# Patient Record
Sex: FEMALE | Race: WHITE | NOT HISPANIC OR LATINO | Employment: FULL TIME | URBAN - METROPOLITAN AREA
[De-identification: names, ages, dates, MRNs, and addresses within clinical notes are randomized per-mention and may not be internally consistent; named-entity substitution may affect disease eponyms.]

---

## 2017-10-11 LAB
CREAT ?TM UR-SCNC: 47.7 UMOL/L
HBA1C MFR BLD HPLC: 5.7 %

## 2018-03-19 LAB — HBA1C MFR BLD HPLC: 5.6 %

## 2018-04-03 ENCOUNTER — OFFICE VISIT (OUTPATIENT)
Dept: ENDOCRINOLOGY | Facility: HOSPITAL | Age: 58
End: 2018-04-03
Payer: COMMERCIAL

## 2018-04-03 VITALS
WEIGHT: 220.2 LBS | BODY MASS INDEX: 34.56 KG/M2 | DIASTOLIC BLOOD PRESSURE: 76 MMHG | HEART RATE: 64 BPM | HEIGHT: 67 IN | SYSTOLIC BLOOD PRESSURE: 132 MMHG

## 2018-04-03 DIAGNOSIS — E04.1 THYROID NODULE: Primary | ICD-10-CM

## 2018-04-03 DIAGNOSIS — E78.5 HYPERLIPIDEMIA, UNSPECIFIED HYPERLIPIDEMIA TYPE: ICD-10-CM

## 2018-04-03 DIAGNOSIS — E11.9 TYPE 2 DIABETES MELLITUS WITHOUT COMPLICATION, WITHOUT LONG-TERM CURRENT USE OF INSULIN (HCC): ICD-10-CM

## 2018-04-03 PROCEDURE — 99204 OFFICE O/P NEW MOD 45 MIN: CPT | Performed by: INTERNAL MEDICINE

## 2018-04-03 RX ORDER — GABAPENTIN 300 MG/1
100 CAPSULE ORAL 3 TIMES DAILY
COMMUNITY
End: 2020-12-02

## 2018-04-03 RX ORDER — FOLIC ACID 1 MG/1
3 TABLET ORAL DAILY
COMMUNITY

## 2018-04-03 RX ORDER — MELOXICAM 15 MG/1
15 TABLET ORAL DAILY
COMMUNITY

## 2018-04-03 RX ORDER — NEEDLES, SAFETY 22GX1 1/2"
NEEDLE, DISPOSABLE MISCELLANEOUS
Refills: 5 | COMMUNITY
Start: 2018-01-23

## 2018-04-03 RX ORDER — ATORVASTATIN CALCIUM 40 MG/1
40 TABLET, FILM COATED ORAL DAILY
COMMUNITY
Start: 2018-02-22

## 2018-04-03 RX ORDER — POTASSIUM CHLORIDE 750 MG/1
20 CAPSULE, EXTENDED RELEASE ORAL 2 TIMES DAILY
COMMUNITY
Start: 2018-02-26

## 2018-04-03 RX ORDER — MULTIVIT-MIN/IRON/FOLIC ACID/K 18-600-40
CAPSULE ORAL
COMMUNITY

## 2018-04-03 RX ORDER — ACETAMINOPHEN 500 MG
500 TABLET ORAL EVERY 6 HOURS PRN
COMMUNITY

## 2018-04-03 RX ORDER — DULOXETIN HYDROCHLORIDE 60 MG/1
60 CAPSULE, DELAYED RELEASE ORAL DAILY
COMMUNITY
Start: 2018-02-14

## 2018-04-03 RX ORDER — INFLIXIMAB 100 MG/10ML
INJECTION, POWDER, LYOPHILIZED, FOR SOLUTION INTRAVENOUS
COMMUNITY

## 2018-04-03 RX ORDER — METHOTREXATE 25 MG/ML
INJECTION, SOLUTION INTRA-ARTERIAL; INTRAMUSCULAR; INTRAVENOUS WEEKLY
Refills: 1 | COMMUNITY
Start: 2018-01-23

## 2018-04-03 RX ORDER — LISINOPRIL 5 MG/1
5 TABLET ORAL DAILY
COMMUNITY
Start: 2018-02-14

## 2018-04-03 RX ORDER — LEUCOVORIN CALCIUM 5 MG/1
TABLET ORAL
COMMUNITY
Start: 2018-01-23

## 2018-04-03 RX ORDER — ASPIRIN 81 MG/1
81 TABLET ORAL DAILY
COMMUNITY

## 2018-04-03 RX ORDER — FUROSEMIDE 20 MG/1
20 TABLET ORAL DAILY
COMMUNITY
Start: 2018-02-14

## 2018-04-03 NOTE — LETTER
April 3, 2018     Ashley Pineda, [address] [facility] [address]    Patient: Justa Her   YOB: 1960   Date of Visit: 4/3/2018       Dear Dr Emmy Mendieta: Thank you for referring Molly Ralph to me for evaluation  Below are my notes for this consultation  If you have questions, please do not hesitate to call me  I look forward to following your patient along with you  Sincerely,        Guy Uribe DO        CC: No Recipients  Guy Uribe DO  4/3/2018 12:22 PM  Sign at close encounter  4/3/2018    Assessment/Plan      Diagnoses and all orders for this visit:    Thyroid nodule    Type 2 diabetes mellitus without complication, without long-term current use of insulin (HCC)  -     Exenatide ER (BYDUREON BCISE) 2 MG/0 85ML AUIJ; Inject 2 mg under the skin once a week  -     metFORMIN (GLUCOPHAGE) 500 mg tablet; 1 tab daily  -     Ambulatory referral to Diabetic Education; Future    Hyperlipidemia, unspecified hyperlipidemia type    Other orders  -     atorvastatin (LIPITOR) 40 mg tablet; Take 40 mg by mouth daily  -     DULoxetine (CYMBALTA) 60 mg delayed release capsule; Take 60 mg by mouth daily  -     furosemide (LASIX) 20 mg tablet; Take 20 mg by mouth daily  -     lisinopril (ZESTRIL) 10 mg tablet; Take 10 mg by mouth daily  -     Discontinue: metFORMIN (GLUCOPHAGE) 500 mg tablet; Take 500 mg by mouth 2 (two) times a day  -     leucovorin (WELLCOVORIN) 5 mg tablet;   -     metoprolol tartrate (LOPRESSOR) 25 mg tablet; Take 25 mg by mouth 2 (two) times a day  -     methotrexate 50 MG/2ML injection; once a week  -     potassium chloride (MICRO-K) 10 MEQ CR capsule; Take 10 mEq by mouth 2 (two) times a day  -     B-D TB SYRINGE 1CC/26GX3/8" 26G X 3/8" 1 ML MISC; INJECT AS DIRECTED  -     folic acid (FOLVITE) 1 mg tablet; Take 3 mg by mouth daily  -     Discontinue: Exenatide (BYETTA 10 MCG PEN SC);  Inject 10 mcg under the skin 2 (two) times a day  - KRILL OIL PO; Take by mouth  -     meloxicam (MOBIC) 15 mg tablet; Take 15 mg by mouth daily  -     Ginger, Zingiber officinalis, (GINGER PO); Take by mouth  -     Cholecalciferol (VITAMIN D) 2000 units CAPS; Take by mouth  -     aspirin (ECOTRIN LOW STRENGTH) 81 mg EC tablet; Take 81 mg by mouth daily  -     gabapentin (NEURONTIN) 300 mg capsule; Take 100 mg by mouth 3 (three) times a day  -     Flaxseed, Linseed, (FLAX SEED OIL PO); Take by mouth  -     acetaminophen (TYLENOL) 500 mg tablet; Take 500 mg by mouth every 6 (six) hours as needed for mild pain  -     Calcium Polycarbophil (FIBER-CAPS PO); Take by mouth  -     Docusate Calcium (STOOL SOFTENER PO); Take by mouth  -     inFLIXimab (REMICADE) 100 mg; Infuse into a venous catheter        Assessment/Plan:  1  Type 2 diabetes:  Controlled on current regimen  Will both I regimen and and have her take metformin 500 mg just once a day  Also transition to Bydureon 2mg weekly with the bcise pen and have her meet with education to learn this new pen  She notes frustration regarding weight gain and difficulty losing weight  Can consider use of saxenda in the future  Her A1c is great we do have room to back off her diabetes medications in the future  Follow-up in 6 months with an A1c just prior  2   Hyperlipidemia:  LDL is slightly higher  She will be following up with her cardiologist for this  3   Thyroid nodules:  Negative biopsy of the larger nodule in the fall of 2017  Will need another ultrasound thyroid function blood work in October of 2018  CC: Diabetes Consult    History of Present Illness     HPI: Alen Martinez is a 59y o  year old female with type 2 diabetes for 6 years  She is on oral agents at home and takes EXENATIDE AND metformin  She denies any polyuria, polydipsia, nocturia and blurry vision  She denies neuropathy, nephropathy and retinopathy  Hypoglycemic episodes: No     The patient's last eye exam was in November 2017  The patient's last foot exam was in October 2017  Blood Sugar/Glucometer/Pump/CGM review: No logs but when she checks she gets 60-80s  She also has thyroid nodules  She denies family history of thyroid cancer or thyroid disease to her knowledge  Denies personal history of radiation exposure to her head or neck  Denies neck compressive symptoms  Negative biopsy in fall of 2017  Review of Systems   Constitutional: Positive for fatigue  Negative for chills and fever  HENT: Negative for trouble swallowing and voice change  Eyes: Negative for visual disturbance  Respiratory: Negative for shortness of breath  Cardiovascular: Negative for chest pain, palpitations and leg swelling  Gastrointestinal: Positive for nausea  Negative for abdominal pain and vomiting  Endocrine: Negative for polydipsia and polyuria  Musculoskeletal: Positive for arthralgias  Negative for myalgias  Skin: Negative for rash  Neurological: Negative for dizziness, tremors and weakness  Hematological: Negative for adenopathy  Psychiatric/Behavioral: Negative for agitation and confusion  Historical Information   History reviewed  No pertinent past medical history  History reviewed  No pertinent surgical history    Social History   History   Alcohol use Not on file     History   Drug use: Unknown     History   Smoking Status    Never Smoker   Smokeless Tobacco    Never Used     Family History:   Family History   Problem Relation Age of Onset    Stroke Mother     Dementia Mother     Diabetes unspecified Father     Heart disease Father     Arthritis Family     Cancer Family     Diabetes unspecified Family     Heart disease Family        Meds/Allergies   Current Outpatient Prescriptions   Medication Sig Dispense Refill    acetaminophen (TYLENOL) 500 mg tablet Take 500 mg by mouth every 6 (six) hours as needed for mild pain      aspirin (ECOTRIN LOW STRENGTH) 81 mg EC tablet Take 81 mg by mouth daily  atorvastatin (LIPITOR) 40 mg tablet Take 40 mg by mouth daily      B-D TB SYRINGE 1CC/26GX3/8" 26G X 3/8" 1 ML MISC INJECT AS DIRECTED  5    Calcium Polycarbophil (FIBER-CAPS PO) Take by mouth      Cholecalciferol (VITAMIN D) 2000 units CAPS Take by mouth      Docusate Calcium (STOOL SOFTENER PO) Take by mouth      DULoxetine (CYMBALTA) 60 mg delayed release capsule Take 60 mg by mouth daily      Flaxseed, Linseed, (FLAX SEED OIL PO) Take by mouth      folic acid (FOLVITE) 1 mg tablet Take 3 mg by mouth daily      furosemide (LASIX) 20 mg tablet Take 20 mg by mouth daily      gabapentin (NEURONTIN) 300 mg capsule Take 100 mg by mouth 3 (three) times a day      Andra, Zingiber officinalis, (ANDRA PO) Take by mouth      inFLIXimab (REMICADE) 100 mg Infuse into a venous catheter      KRILL OIL PO Take by mouth      leucovorin (WELLCOVORIN) 5 mg tablet       lisinopril (ZESTRIL) 10 mg tablet Take 10 mg by mouth daily      meloxicam (MOBIC) 15 mg tablet Take 15 mg by mouth daily      metFORMIN (GLUCOPHAGE) 500 mg tablet 1 tab daily  30 tablet 11    methotrexate 50 MG/2ML injection once a week  1    metoprolol tartrate (LOPRESSOR) 25 mg tablet Take 25 mg by mouth 2 (two) times a day      potassium chloride (MICRO-K) 10 MEQ CR capsule Take 10 mEq by mouth 2 (two) times a day      Exenatide ER (BYDUREON BCISE) 2 MG/0 85ML AUIJ Inject 2 mg under the skin once a week 12 pen 3     No current facility-administered medications for this visit  Allergies   Allergen Reactions    Digoxin Nausea Only, GI Intolerance and Vomiting       Objective   Vitals: Blood pressure 132/76, pulse 64, height 5' 7 28" (1 709 m), weight 99 9 kg (220 lb 3 2 oz)  Invasive Devices          No matching active lines, drains, or airways          Physical Exam   Constitutional: She is oriented to person, place, and time  She appears well-developed and well-nourished  No distress  HENT:   Head: Normocephalic and atraumatic  Mouth/Throat: No oropharyngeal exudate  Eyes: Conjunctivae and EOM are normal  Pupils are equal, round, and reactive to light  No scleral icterus  Neck: Normal range of motion  Neck supple  No thyromegaly present  Cardiovascular: Normal rate and regular rhythm  No murmur heard  Pulmonary/Chest: Effort normal and breath sounds normal  She has no wheezes  Abdominal: Soft  Bowel sounds are normal  She exhibits no distension  There is no tenderness  Musculoskeletal: Normal range of motion  She exhibits no edema  Neurological: She is alert and oriented to person, place, and time  She exhibits normal muscle tone  Skin: Skin is warm and dry  No rash noted  She is not diaphoretic  Psychiatric: She has a normal mood and affect  Her behavior is normal        The history was obtained from the review of the chart and from the patient  Lab Results:   03/19/2018 at [facility]:  Hemoglobin 14, glucose 108, creatinine 0 65, GFR 97, sodium 138, potassium 4 4, liver function within normal limits, calcium 9 9, total cholesterol 246, triglycerides 162, , A1c 5 6, B12 797     11/09/2017 at [address] [place] [address] FNA of left thyroid nodule:  Negative for malignancy  10/31/2017 thyroid ultrasound at [facility]:  Right lobe lower pole 0 6 x 0 4 x 0 6 isoechoic/solid nodule  Left lobe lower pole anteriorly there is a 1 2 x 0 7 by 1 0 hypoechoic/solid nodule a contain scattered punctate echogenicities      Future Appointments  Date Time Provider Marcello Degroot   10/9/2018 12:45 PM BERTA Nguyen [facility]

## 2018-04-03 NOTE — PROGRESS NOTES
4/3/2018    Assessment/Plan      Diagnoses and all orders for this visit:    Thyroid nodule    Type 2 diabetes mellitus without complication, without long-term current use of insulin (HCC)  -     Exenatide ER (BYDUREON BCISE) 2 MG/0 85ML AUIJ; Inject 2 mg under the skin once a week  -     metFORMIN (GLUCOPHAGE) 500 mg tablet; 1 tab daily  -     Ambulatory referral to Diabetic Education; Future    Hyperlipidemia, unspecified hyperlipidemia type    Other orders  -     atorvastatin (LIPITOR) 40 mg tablet; Take 40 mg by mouth daily  -     DULoxetine (CYMBALTA) 60 mg delayed release capsule; Take 60 mg by mouth daily  -     furosemide (LASIX) 20 mg tablet; Take 20 mg by mouth daily  -     lisinopril (ZESTRIL) 10 mg tablet; Take 10 mg by mouth daily  -     Discontinue: metFORMIN (GLUCOPHAGE) 500 mg tablet; Take 500 mg by mouth 2 (two) times a day  -     leucovorin (WELLCOVORIN) 5 mg tablet;   -     metoprolol tartrate (LOPRESSOR) 25 mg tablet; Take 25 mg by mouth 2 (two) times a day  -     methotrexate 50 MG/2ML injection; once a week  -     potassium chloride (MICRO-K) 10 MEQ CR capsule; Take 10 mEq by mouth 2 (two) times a day  -     B-D TB SYRINGE 1CC/26GX3/8" 26G X 3/8" 1 ML MISC; INJECT AS DIRECTED  -     folic acid (FOLVITE) 1 mg tablet; Take 3 mg by mouth daily  -     Discontinue: Exenatide (BYETTA 10 MCG PEN SC); Inject 10 mcg under the skin 2 (two) times a day  -     KRILL OIL PO; Take by mouth  -     meloxicam (MOBIC) 15 mg tablet; Take 15 mg by mouth daily  -     Ginger, Zingiber officinalis, (GINGER PO); Take by mouth  -     Cholecalciferol (VITAMIN D) 2000 units CAPS; Take by mouth  -     aspirin (ECOTRIN LOW STRENGTH) 81 mg EC tablet; Take 81 mg by mouth daily  -     gabapentin (NEURONTIN) 300 mg capsule; Take 100 mg by mouth 3 (three) times a day  -     Flaxseed, Linseed, (FLAX SEED OIL PO); Take by mouth  -     acetaminophen (TYLENOL) 500 mg tablet;  Take 500 mg by mouth every 6 (six) hours as needed for mild pain  -     Calcium Polycarbophil (FIBER-CAPS PO); Take by mouth  -     Docusate Calcium (STOOL SOFTENER PO); Take by mouth  -     inFLIXimab (REMICADE) 100 mg; Infuse into a venous catheter        Assessment/Plan:  1  Type 2 diabetes:  Controlled on current regimen  Will both I regimen and and have her take metformin 500 mg just once a day  Also transition to Bydureon 2mg weekly with the bcise pen and have her meet with education to learn this new pen  She notes frustration regarding weight gain and difficulty losing weight  Can consider use of saxenda in the future  Her A1c is great we do have room to back off her diabetes medications in the future  Follow-up in 6 months with an A1c just prior  2   Hyperlipidemia:  LDL is slightly higher  She will be following up with her cardiologist for this  3   Thyroid nodules:  Negative biopsy of the larger nodule in the fall of 2017  Will need another ultrasound thyroid function blood work in October of 2018  CC: Diabetes Consult    History of Present Illness     HPI: Rowland Primrose is a 59y o  year old female with type 2 diabetes for 6 years  She is on oral agents at home and takes EXENATIDE AND metformin  She denies any polyuria, polydipsia, nocturia and blurry vision  She denies neuropathy, nephropathy and retinopathy  Hypoglycemic episodes: No     The patient's last eye exam was in November 2017  The patient's last foot exam was in October 2017  Blood Sugar/Glucometer/Pump/CGM review: No logs but when she checks she gets 60-80s  She also has thyroid nodules  She denies family history of thyroid cancer or thyroid disease to her knowledge  Denies personal history of radiation exposure to her head or neck  Denies neck compressive symptoms  Negative biopsy in fall of 2017  Review of Systems   Constitutional: Positive for fatigue  Negative for chills and fever  HENT: Negative for trouble swallowing and voice change      Eyes: Negative for visual disturbance  Respiratory: Negative for shortness of breath  Cardiovascular: Negative for chest pain, palpitations and leg swelling  Gastrointestinal: Positive for nausea  Negative for abdominal pain and vomiting  Endocrine: Negative for polydipsia and polyuria  Musculoskeletal: Positive for arthralgias  Negative for myalgias  Skin: Negative for rash  Neurological: Negative for dizziness, tremors and weakness  Hematological: Negative for adenopathy  Psychiatric/Behavioral: Negative for agitation and confusion  Historical Information   History reviewed  No pertinent past medical history  History reviewed  No pertinent surgical history    Social History   History   Alcohol use Not on file     History   Drug use: Unknown     History   Smoking Status    Never Smoker   Smokeless Tobacco    Never Used     Family History:   Family History   Problem Relation Age of Onset    Stroke Mother     Dementia Mother     Diabetes unspecified Father     Heart disease Father     Arthritis Family     Cancer Family     Diabetes unspecified Family     Heart disease Family        Meds/Allergies   Current Outpatient Prescriptions   Medication Sig Dispense Refill    acetaminophen (TYLENOL) 500 mg tablet Take 500 mg by mouth every 6 (six) hours as needed for mild pain      aspirin (ECOTRIN LOW STRENGTH) 81 mg EC tablet Take 81 mg by mouth daily      atorvastatin (LIPITOR) 40 mg tablet Take 40 mg by mouth daily      B-D TB SYRINGE 1CC/26GX3/8" 26G X 3/8" 1 ML MISC INJECT AS DIRECTED  5    Calcium Polycarbophil (FIBER-CAPS PO) Take by mouth      Cholecalciferol (VITAMIN D) 2000 units CAPS Take by mouth      Docusate Calcium (STOOL SOFTENER PO) Take by mouth      DULoxetine (CYMBALTA) 60 mg delayed release capsule Take 60 mg by mouth daily      Flaxseed, Linseed, (FLAX SEED OIL PO) Take by mouth      folic acid (FOLVITE) 1 mg tablet Take 3 mg by mouth daily      furosemide (LASIX) 20 mg tablet Take 20 mg by mouth daily      gabapentin (NEURONTIN) 300 mg capsule Take 100 mg by mouth 3 (three) times a day      Andra, Zingiber officinalis, (ANDRA PO) Take by mouth      inFLIXimab (REMICADE) 100 mg Infuse into a venous catheter      KRILL OIL PO Take by mouth      leucovorin (WELLCOVORIN) 5 mg tablet       lisinopril (ZESTRIL) 10 mg tablet Take 10 mg by mouth daily      meloxicam (MOBIC) 15 mg tablet Take 15 mg by mouth daily      metFORMIN (GLUCOPHAGE) 500 mg tablet 1 tab daily  30 tablet 11    methotrexate 50 MG/2ML injection once a week  1    metoprolol tartrate (LOPRESSOR) 25 mg tablet Take 25 mg by mouth 2 (two) times a day      potassium chloride (MICRO-K) 10 MEQ CR capsule Take 10 mEq by mouth 2 (two) times a day      Exenatide ER (BYDUREON BCISE) 2 MG/0 85ML AUIJ Inject 2 mg under the skin once a week 12 pen 3     No current facility-administered medications for this visit  Allergies   Allergen Reactions    Digoxin Nausea Only, GI Intolerance and Vomiting       Objective   Vitals: Blood pressure 132/76, pulse 64, height 5' 7 28" (1 709 m), weight 99 9 kg (220 lb 3 2 oz)  Invasive Devices          No matching active lines, drains, or airways          Physical Exam   Constitutional: She is oriented to person, place, and time  She appears well-developed and well-nourished  No distress  HENT:   Head: Normocephalic and atraumatic  Mouth/Throat: No oropharyngeal exudate  Eyes: Conjunctivae and EOM are normal  Pupils are equal, round, and reactive to light  No scleral icterus  Neck: Normal range of motion  Neck supple  No thyromegaly present  Cardiovascular: Normal rate and regular rhythm  No murmur heard  Pulmonary/Chest: Effort normal and breath sounds normal  She has no wheezes  Abdominal: Soft  Bowel sounds are normal  She exhibits no distension  There is no tenderness  Musculoskeletal: Normal range of motion  She exhibits no edema     Neurological: She is alert and oriented to person, place, and time  She exhibits normal muscle tone  Skin: Skin is warm and dry  No rash noted  She is not diaphoretic  Psychiatric: She has a normal mood and affect  Her behavior is normal        The history was obtained from the review of the chart and from the patient  Lab Results:   03/19/2018 at [facility]:  Hemoglobin 14, glucose 108, creatinine 0 65, GFR 97, sodium 138, potassium 4 4, liver function within normal limits, calcium 9 9, total cholesterol 246, triglycerides 162, , A1c 5 6, B12 797     11/09/2017 at [facility] FNA of left thyroid nodule:  Negative for malignancy  10/31/2017 thyroid ultrasound at [facility]:  Right lobe lower pole 0 6 x 0 4 x 0 6 isoechoic/solid nodule  Left lobe lower pole anteriorly there is a 1 2 x 0 7 by 1 0 hypoechoic/solid nodule a contain scattered punctate echogenicities      Future Appointments  Date Time Provider Marcello Degroot   10/9/2018 12:45 PM BERTA Orellana [facility]

## 2018-04-10 ENCOUNTER — OFFICE VISIT (OUTPATIENT)
Dept: DIABETES SERVICES | Facility: HOSPITAL | Age: 58
End: 2018-04-10
Payer: COMMERCIAL

## 2018-04-10 VITALS — BODY MASS INDEX: 32.89 KG/M2 | WEIGHT: 217 LBS | HEIGHT: 68 IN

## 2018-04-10 DIAGNOSIS — E11.9 TYPE 2 DIABETES MELLITUS WITHOUT COMPLICATION, WITHOUT LONG-TERM CURRENT USE OF INSULIN (HCC): Primary | ICD-10-CM

## 2018-04-10 PROCEDURE — G0108 DIAB MANAGE TRN  PER INDIV: HCPCS | Performed by: DIETITIAN, REGISTERED

## 2018-04-10 NOTE — Clinical Note
Bydureon teaching completed  She would like to come see me for MNT, scheduled for next week  Please send over an MNT referral to have on file whenever you get a chance   Thanks! [employer]

## 2018-04-10 NOTE — PROGRESS NOTES
Bydureon Education    Met with Rosanne Carroll for [employer]  Colton Cosme was instructed on site selection and rotation; safe needle disposal; medication storage; side effects and precautions of Bydureon  Discused primary side effects of nausea/vomiting, and risk of pancreatitis and thyroid tumor  Patient knows to call their doctor with abdominal pain or lump/swelling in neck  Coltonarline Jimenesa understands this is a once a week injection and should be taken the same day every week  Suggested marking the calendar for injection day for the first few weeks so the medication is not forgotten  We completed training with demo pens, Colton Cosme demonstrated proper pen usage in office today  Instructed that steady states of the medication can be up to 6-10 weeks, therefore Lillie may not notice much change in blood sugars during the first few weeks, this is normal  Discussed that it can be normal to feel a small ball/lump under the skin where injection occurred for a few weeks, this is normal as well  Colton Cosme verbalized understanding of material covered today, knows to call with any questions or concerns  She is interested in MNT, states has never received diabetes education  Scheduled for MNT next week, will request a new referral from Dr Cherie Iniguez  Diabetes Education Record: Colton Cosme was given the following education material: [employer] from the company, hypoglycemia handout  Patient response to instruction  Comprehension: good  Motivation: good  Expected Compliance: good  Readiness: action    Thank you for referring your patient to [facility], it was a pleasure working with them today  Please feel free to call with any questions or concerns      Carina Gibbs, [address] [place] [id number]

## 2018-04-11 DIAGNOSIS — E11.8 TYPE 2 DIABETES MELLITUS WITH COMPLICATION, UNSPECIFIED WHETHER LONG TERM INSULIN USE: Primary | ICD-10-CM

## 2018-04-17 ENCOUNTER — OFFICE VISIT (OUTPATIENT)
Dept: DIABETES SERVICES | Facility: HOSPITAL | Age: 58
End: 2018-04-17
Payer: COMMERCIAL

## 2018-04-17 VITALS — BODY MASS INDEX: 33.49 KG/M2 | WEIGHT: 221 LBS | HEIGHT: 68 IN

## 2018-04-17 DIAGNOSIS — E11.8 DIABETES MELLITUS WITH COMPLICATION (HCC): Primary | ICD-10-CM

## 2018-04-17 PROCEDURE — 97802 MEDICAL NUTRITION INDIV IN: CPT | Performed by: DIETITIAN, REGISTERED

## 2018-04-17 NOTE — PROGRESS NOTES
Medical Nutrition Therapy     Assessment    Visit Type: Initial visit  Chief complaint:  Type 2 Diabetes     HPI:  Met with Shade Pina for initial MNT  Testing sugars in the morning and sometimes at night, sugars almost always in goal range  No log with her, review of her meter shows she tests every few days  States fastings are highest at 120  Recent A1C 5 5% she states  Looking for help with her eating, she eats low carbs and tries to be healthy but frustrated by her weight  Knows that her medications for other health conditions contributes to the weight gain  Food recall shows primary issues: eats many carb free meals then feels shaky later, then has carb containing snacks like fruit on it's own  Discussed the benefit of consistent carb intake and not having carb free meals  Best to eat carbs and protein together, with snacks being lower carb rather than the other way around  She is a stress eater  Often goes for many hours without eating due to her work being on demand  I asked her to keep an online food record for a few days, I anticipate she might not be breaking 1,000kcal/day, which studies show eating too little can hinder weight loss  Together we discussed what foods contain CHO, reading a food label, serving sizes, and set a carb goal of 15-30g CHO/meal to promote weight loss with 15g or less snacks  Put together sample meals for Lillie's reference and evaluated Lillie's current eating plan  Good understanding, Shade Pina will call with questions or for more education  No f/u scheduled at this time         Ht Readings from Last 1 Encounters:   04/10/18 5' 7 5" (1 715 m)     Wt Readings from Last 1 Encounters:   04/10/18 98 4 kg (217 lb)     Weight Change: No    Medical Diagnosis/ICD 10 Code:  E11 8    Barriers to Learning: no barriers    Do you follow any special diet presently?: Yes  Who shops: patient  Who cooks: patient    Food Log: Completed via the method of food recall    Breakfast: rising time varies, up around 5:45am, drinks 24oz water and takes her meds, gets dressed, does 10min on the bike sometimes  6:30am-ishTypical breakfast 3 times a week: 1/2c egg beaters, some low salt low fat ham in it, sometimes a real egg added to it, a fruit like a clemetine and low fat cottage cheese, sometimes with the nausea will have some light wheat bread or 1 slice of rye, or bagel thin  If oatmeal has some yogurt with it; sometimes when days are crazy will do a low sugar smoothie premade or from nutrisystem and fruit, or protein bar lower carb ones   Morning Snack: not much   Lunch: lunch is a hard time, makes a salad the night before so that she is more likely to have it  If feels she is getting shaky and headachey she will reach for [employer] to statisfy it  Colder months will do a vegetable soup  Afternoon Snack: loves baby carrots before dinner plain   Dinner: tries to plan ahead, things like chicken and veggies and dima slaw, limits starches;  joes frozen meal with whole grains and adds more veggies to it  6pm   Evening Snack: never used to until starting nutrisystem, now she feels like she is supposed to from doing that  Fruit or cheese, buys single bags to help control portions     Beverages: water lots of that, herbal tea with lots of stevia, no coffee, icea a lot, rare milk, mixes sometimes sugar free, very little soda as a treat, powerade zero rare when is sick   Eating out/Take out:  Exercise bike at home     Nutrition Diagnosis:  Food and nutrition related knowledge deficit  related to Lack of prior exposure to accurate nutrition related information as evidenced by Verbalizes inaccurate or incomplete information    Intervention: plate method, increased fiber intake, carbohydrate counting, meal timing and individualized meal plan     Treatment Goals: Patient understands education and recommendations    Education Material Given  Mojgan Pedro Luis was provided the Portion Booklet and Planning Healthy Meals     Monitoring and evaluation:    Term code indicator  FH 1 6 3 Carbohydrate Intake Criteria: 15-30g CHO per meal, 15g CHO snacks    Patients Response to Instruction:  Chandler Salazar  Expected Compliancegood    Thank you for coming to the [facility] for education today  Please feel free to call with any questions or concerns      [facility] [place], [address] [place] [phone]

## 2018-04-17 NOTE — PATIENT INSTRUCTIONS
15-30g carbs per meal with protein, lower carb items at the snacks   Test sugars when feeling low to see if they are actually low   Test sugars twice a day- before a meal and 2hr after the same meal, goal is a change of 50pts or less   Check sugars fasting for 2 weeks: 1 week with nothing to eat after dinner, 1 week with a bedtime snack, see which one gives better morning sugars

## 2018-07-23 LAB — HBA1C MFR BLD HPLC: 5.7 %

## 2018-10-31 ENCOUNTER — OFFICE VISIT (OUTPATIENT)
Dept: ENDOCRINOLOGY | Facility: HOSPITAL | Age: 58
End: 2018-10-31
Payer: COMMERCIAL

## 2018-10-31 VITALS
WEIGHT: 224 LBS | SYSTOLIC BLOOD PRESSURE: 124 MMHG | BODY MASS INDEX: 35.16 KG/M2 | HEART RATE: 64 BPM | DIASTOLIC BLOOD PRESSURE: 80 MMHG | HEIGHT: 67 IN

## 2018-10-31 DIAGNOSIS — E11.9 TYPE 2 DIABETES MELLITUS WITHOUT COMPLICATION, WITHOUT LONG-TERM CURRENT USE OF INSULIN (HCC): Primary | ICD-10-CM

## 2018-10-31 DIAGNOSIS — I10 ESSENTIAL HYPERTENSION: ICD-10-CM

## 2018-10-31 DIAGNOSIS — E04.2 MULTIPLE THYROID NODULES: ICD-10-CM

## 2018-10-31 DIAGNOSIS — E78.5 HYPERLIPIDEMIA, UNSPECIFIED HYPERLIPIDEMIA TYPE: ICD-10-CM

## 2018-10-31 PROCEDURE — 99214 OFFICE O/P EST MOD 30 MIN: CPT | Performed by: INTERNAL MEDICINE

## 2018-10-31 NOTE — LETTER
October 31, 2018     Arie Raymundo, [address]    Patient: Hattie Rascon   YOB: 1960   Date of Visit: 10/31/2018       Dear Dr Helena Lr: Thank you for referring Kirby Anandtoby to me for evaluation  Below are my notes for this consultation  If you have questions, please do not hesitate to call me  I look forward to following your patient along with you  Sincerely,        Skyler Bañuelos DO        CC: No Recipients  Skyler Bañuelos DO  10/31/2018  2:57 PM  Sign at close encounter  10/31/2018    Assessment/Plan      Diagnoses and all orders for this visit:    Type 2 diabetes mellitus without complication, without long-term current use of insulin ([facility] [address] )  -     Comprehensive metabolic panel Lab Collect; Future  -     HEMOGLOBIN A1C W/ EAG ESTIMATION Lab Collect; Future  -     Microalbumin / creatinine urine ratio- Lab Collect; Future  -     Dapagliflozin Propanediol (FARXIGA) 5 MG TABS; Take 1 tablet (5 mg total) by mouth daily  -     Comprehensive metabolic panel Lab Collect  -     HEMOGLOBIN A1C W/ EAG ESTIMATION Lab Collect  -     Microalbumin / creatinine urine ratio- Lab Collect    Multiple thyroid nodules  -     TSH, 3rd generation Lab Collect  -     T4, free Lab Collect  -     US thyroid; Future    Hyperlipidemia, unspecified hyperlipidemia type  -     Lipid Panel with Direct LDL reflex Lab Collect; Future  -     Lipid Panel with Direct LDL reflex Lab Collect    Essential hypertension        Assessment/Plan:  1  Type 2 diabetes: This is well controlled on current regimen  We discussed that given her difficulty with weight loss despite strict dietary and and as much exercise as tolerated given her rheumatologic disease that we can consider adjusting her medications a little bit    In this regard I have suggested we hold off on the metformin for now and try Farxiga 5 mg daily in addition to Bydureon see if this helps in terms of weight reduction slightly  Discussed side effects of Daiana Hopson  Discussed that weight reduction will likely not be a large amount but may be enough to start her in the right direction  She will call us if any side effects or concerns emerge  Otherwise we will plan to see her back in 6 months with labs just prior  2   Thyroid nodules:  She is due for a thyroid ultrasound which I have ordered  In fall of 2017 she had a negative biopsy of the large nodule  She is due for a TSH and free T4 which I have ordered  3   Hyperlipidemia:  Controlled on current regimen  4   Hypertension:  Controlled on current regimen  CC: Diabetes Consult    History of Present Illness     HPI: Tiara Hawkins is a 59y o  year old female with type 2 diabetes for 6 years  She is on oral agents at home and takes metformin 500 mg daily and Bydureon 2 mg weekly  She denies any polyuria, polydipsia, nocturia and blurry vision  She denies neuropathy, nephropathy and retinopathy  Hypoglycemic episodes: No      The patient's last eye exam was in November 2017  Blood Sugar/Glucometer/Pump/CGM review:   No logs to review  For hypertension she takes lisinopril 10 mg daily  She takes atorvastatin 40 mg daily for hyperlipidemia  She has a history of thyroid nodules with a negative biopsy in the fall of 2017  Review of Systems   Constitutional: Negative for fatigue  HENT: Negative for trouble swallowing and voice change  Eyes: Negative for visual disturbance  Respiratory: Negative for shortness of breath  Cardiovascular: Negative for chest pain, palpitations and leg swelling  Gastrointestinal: Negative for abdominal pain, nausea and vomiting  Endocrine: Negative for cold intolerance, heat intolerance, polydipsia and polyuria  Musculoskeletal: Negative for arthralgias and myalgias  Skin: Negative for rash  Neurological: Negative for dizziness, tremors and weakness  Hematological: Negative for adenopathy  Psychiatric/Behavioral: Negative for agitation and confusion  Historical Information   No past medical history on file  No past surgical history on file    Social History   History   Alcohol Use No     History   Drug Use No     History   Smoking Status    Never Smoker   Smokeless Tobacco    Never Used     Family History:   Family History   Problem Relation Age of Onset    Stroke Mother     Dementia Mother     Diabetes unspecified Father     Heart disease Father     Arthritis Family     Cancer Family     Diabetes unspecified Family     Heart disease Family        Meds/Allergies   Current Outpatient Prescriptions   Medication Sig Dispense Refill    acetaminophen (TYLENOL) 500 mg tablet Take 500 mg by mouth every 6 (six) hours as needed for mild pain      aspirin (ECOTRIN LOW STRENGTH) 81 mg EC tablet Take 81 mg by mouth daily      atorvastatin (LIPITOR) 40 mg tablet Take 40 mg by mouth daily      B-D TB SYRINGE 1CC/26GX3/8" 26G X 3/8" 1 ML MISC INJECT AS DIRECTED  5    Calcium Polycarbophil (FIBER-CAPS PO) Take by mouth      Cholecalciferol (VITAMIN D) 2000 units CAPS Take by mouth      Docusate Calcium (STOOL SOFTENER PO) Take by mouth      DULoxetine (CYMBALTA) 60 mg delayed release capsule Take 60 mg by mouth daily      Exenatide ER (BYDUREON BCISE) 2 MG/0 85ML AUIJ Inject 2 mg under the skin once a week 12 pen 3    Flaxseed, Linseed, (FLAX SEED OIL PO) Take by mouth      folic acid (FOLVITE) 1 mg tablet Take 3 mg by mouth daily      furosemide (LASIX) 20 mg tablet Take 20 mg by mouth daily      Ginger, Zingiber officinalis, (GINGER PO) Take by mouth      inFLIXimab (REMICADE) 100 mg Infuse into a venous catheter      KRILL OIL PO Take by mouth      leucovorin (WELLCOVORIN) 5 mg tablet       lisinopril (ZESTRIL) 10 mg tablet Take 10 mg by mouth daily      meloxicam (MOBIC) 15 mg tablet Take 15 mg by mouth daily      methotrexate 50 MG/2ML injection once a week  1    metoprolol tartrate (LOPRESSOR) 25 mg tablet Take 25 mg by mouth 2 (two) times a day      potassium chloride (MICRO-K) 10 MEQ CR capsule Take 10 mEq by mouth 2 (two) times a day      Dapagliflozin Propanediol (FARXIGA) 5 MG TABS Take 1 tablet (5 mg total) by mouth daily 90 tablet 3    gabapentin (NEURONTIN) 300 mg capsule Take 100 mg by mouth 3 (three) times a day       No current facility-administered medications for this visit  Allergies   Allergen Reactions    Digoxin Nausea Only, GI Intolerance and Vomiting     Other reaction(s): stomach upset    Other      Other reaction(s): vomiting    Prochlorperazine      Other reaction(s): stomach upset       Objective   Vitals: Blood pressure 124/80, pulse 64, height 5' 7" (1 702 m), weight 102 kg (224 lb)  Invasive Devices          No matching active lines, drains, or airways          Physical Exam   Constitutional: She is oriented to person, place, and time  She appears well-developed and well-nourished  No distress  HENT:   Head: Normocephalic and atraumatic  Eyes: Pupils are equal, round, and reactive to light  Conjunctivae are normal    Neck: Normal range of motion  Neck supple  No thyromegaly present  Cardiovascular: Normal rate and regular rhythm  Pulmonary/Chest: Effort normal  No respiratory distress  Abdominal: Soft  Bowel sounds are normal  She exhibits no distension  Musculoskeletal: Normal range of motion  She exhibits no edema  Neurological: She is alert and oriented to person, place, and time  She exhibits normal muscle tone  Skin: Skin is warm and dry  No rash noted  She is not diaphoretic  Psychiatric: She has a normal mood and affect  Her behavior is normal    Vitals reviewed  The history was obtained from the review of the chart and from the patient      Lab Results:    Most recent Alc is  Lab Results   Component Value Date    HGBA1C 5 6 03/19/2018             Recent Results (from the past 46602 hour(s))   Hemoglobin A1C Collection Time: 10/11/17 12:00 AM   Result Value Ref Range    Hemoglobin A1C 5 7    Microalbumin / creatinine urine ratio    Collection Time: 10/11/17 12:00 AM   Result Value Ref Range    EXT Creatinine Urine 47 7     Microalbum  ,U,Random 3 0 0 0 - 20 0 mg/L    EXTERNAL Microalb/Creat Ratio 3 0    Hemoglobin A1C    Collection Time: 10/11/17 12:00 AM   Result Value Ref Range    Hemoglobin A1C 5 7    Microalbumin / creatinine urine ratio    Collection Time: 10/11/17 12:00 AM   Result Value Ref Range    EXT Creatinine Urine 47 7    Hemoglobin A1C    Collection Time: 03/19/18 12:00 AM   Result Value Ref Range    Hemoglobin A1C 5 6      Labs from 07/23/2018:  A1c 5 7, triglycerides 151, total cholesterol 175, HDL 78, LDL 67, glucose 103, BUN 18, creatinine 0 66, GFR 96, sodium 136, potassium 4 5, calcium 9 6, albumin 4 6, liver function within normal limits  No future appointments  Portions of the record may have been created with voice recognition software  Occasional wrong word or "sound a like" substitutions may have occurred due to the inherent limitations of voice recognition software  Read the chart carefully and recognize, using context, where substitutions have occurred

## 2018-10-31 NOTE — PROGRESS NOTES
10/31/2018    Assessment/Plan      Diagnoses and all orders for this visit:    Type 2 diabetes mellitus without complication, without long-term current use of insulin ([facility] [address] )  -     Comprehensive metabolic panel Lab Collect; Future  -     HEMOGLOBIN A1C W/ EAG ESTIMATION Lab Collect; Future  -     Microalbumin / creatinine urine ratio- Lab Collect; Future  -     Dapagliflozin Propanediol (FARXIGA) 5 MG TABS; Take 1 tablet (5 mg total) by mouth daily  -     Comprehensive metabolic panel Lab Collect  -     HEMOGLOBIN A1C W/ EAG ESTIMATION Lab Collect  -     Microalbumin / creatinine urine ratio- Lab Collect    Multiple thyroid nodules  -     TSH, 3rd generation Lab Collect  -     T4, free Lab Collect  -     US thyroid; Future    Hyperlipidemia, unspecified hyperlipidemia type  -     Lipid Panel with Direct LDL reflex Lab Collect; Future  -     Lipid Panel with Direct LDL reflex Lab Collect    Essential hypertension        Assessment/Plan:  1  Type 2 diabetes: This is well controlled on current regimen  We discussed that given her difficulty with weight loss despite strict dietary and and as much exercise as tolerated given her rheumatologic disease that we can consider adjusting her medications a little bit  In this regard I have suggested we hold off on the metformin for now and try Farxiga 5 mg daily in addition to Bydureon see if this helps in terms of weight reduction slightly  Discussed side effects of [place]  Discussed that weight reduction will likely not be a large amount but may be enough to start her in the right direction  She will call us if any side effects or concerns emerge  Otherwise we will plan to see her back in 6 months with labs just prior  2   Thyroid nodules:  She is due for a thyroid ultrasound which I have ordered  In fall of 2017 she had a negative biopsy of the large nodule  She is due for a TSH and free T4 which I have ordered      3   Hyperlipidemia:  Controlled on current regimen  4   Hypertension:  Controlled on current regimen  CC: Diabetes Consult    History of Present Illness     HPI: Reina Back is a 59y o  year old female with type 2 diabetes for 6 years  She is on oral agents at home and takes metformin 500 mg daily and Bydureon 2 mg weekly  She denies any polyuria, polydipsia, nocturia and blurry vision  She denies neuropathy, nephropathy and retinopathy  Hypoglycemic episodes: No      The patient's last eye exam was in November 2017  Blood Sugar/Glucometer/Pump/CGM review:   No logs to review  For hypertension she takes lisinopril 10 mg daily  She takes atorvastatin 40 mg daily for hyperlipidemia  She has a history of thyroid nodules with a negative biopsy in the fall of 2017  Review of Systems   Constitutional: Negative for fatigue  HENT: Negative for trouble swallowing and voice change  Eyes: Negative for visual disturbance  Respiratory: Negative for shortness of breath  Cardiovascular: Negative for chest pain, palpitations and leg swelling  Gastrointestinal: Negative for abdominal pain, nausea and vomiting  Endocrine: Negative for cold intolerance, heat intolerance, polydipsia and polyuria  Musculoskeletal: Negative for arthralgias and myalgias  Skin: Negative for rash  Neurological: Negative for dizziness, tremors and weakness  Hematological: Negative for adenopathy  Psychiatric/Behavioral: Negative for agitation and confusion  Historical Information   No past medical history on file  No past surgical history on file    Social History   History   Alcohol Use No     History   Drug Use No     History   Smoking Status    Never Smoker   Smokeless Tobacco    Never Used     Family History:   Family History   Problem Relation Age of Onset   Huber Hopkins Stroke Mother     Dementia Mother     Diabetes unspecified Father     Heart disease Father     Arthritis Family     Cancer Family     Diabetes unspecified Family     Heart disease Family        Meds/Allergies   Current Outpatient Prescriptions   Medication Sig Dispense Refill    acetaminophen (TYLENOL) 500 mg tablet Take 500 mg by mouth every 6 (six) hours as needed for mild pain      aspirin (ECOTRIN LOW STRENGTH) 81 mg EC tablet Take 81 mg by mouth daily      atorvastatin (LIPITOR) 40 mg tablet Take 40 mg by mouth daily      B-D TB SYRINGE 1CC/26GX3/8" 26G X 3/8" 1 ML MISC INJECT AS DIRECTED  5    Calcium Polycarbophil (FIBER-CAPS PO) Take by mouth      Cholecalciferol (VITAMIN D) 2000 units CAPS Take by mouth      Docusate Calcium (STOOL SOFTENER PO) Take by mouth      DULoxetine (CYMBALTA) 60 mg delayed release capsule Take 60 mg by mouth daily      Exenatide ER (BYDUREON BCISE) 2 MG/0 85ML AUIJ Inject 2 mg under the skin once a week 12 pen 3    Flaxseed, Linseed, (FLAX SEED OIL PO) Take by mouth      folic acid (FOLVITE) 1 mg tablet Take 3 mg by mouth daily      furosemide (LASIX) 20 mg tablet Take 20 mg by mouth daily      Ginger, Zingiber officinalis, (GINGER PO) Take by mouth      inFLIXimab (REMICADE) 100 mg Infuse into a venous catheter      KRILL OIL PO Take by mouth      leucovorin (WELLCOVORIN) 5 mg tablet       lisinopril (ZESTRIL) 10 mg tablet Take 10 mg by mouth daily      meloxicam (MOBIC) 15 mg tablet Take 15 mg by mouth daily      methotrexate 50 MG/2ML injection once a week  1    metoprolol tartrate (LOPRESSOR) 25 mg tablet Take 25 mg by mouth 2 (two) times a day      potassium chloride (MICRO-K) 10 MEQ CR capsule Take 10 mEq by mouth 2 (two) times a day      Dapagliflozin Propanediol (FARXIGA) 5 MG TABS Take 1 tablet (5 mg total) by mouth daily 90 tablet 3    gabapentin (NEURONTIN) 300 mg capsule Take 100 mg by mouth 3 (three) times a day       No current facility-administered medications for this visit        Allergies   Allergen Reactions    Digoxin Nausea Only, GI Intolerance and Vomiting     Other reaction(s): stomach upset    Other      Other reaction(s): vomiting    Prochlorperazine      Other reaction(s): stomach upset       Objective   Vitals: Blood pressure 124/80, pulse 64, height 5' 7" (1 702 m), weight 102 kg (224 lb)  Invasive Devices          No matching active lines, drains, or airways          Physical Exam   Constitutional: She is oriented to person, place, and time  She appears well-developed and well-nourished  No distress  HENT:   Head: Normocephalic and atraumatic  Eyes: Pupils are equal, round, and reactive to light  Conjunctivae are normal    Neck: Normal range of motion  Neck supple  No thyromegaly present  Cardiovascular: Normal rate and regular rhythm  Pulmonary/Chest: Effort normal  No respiratory distress  Abdominal: Soft  Bowel sounds are normal  She exhibits no distension  Musculoskeletal: Normal range of motion  She exhibits no edema  Neurological: She is alert and oriented to person, place, and time  She exhibits normal muscle tone  Skin: Skin is warm and dry  No rash noted  She is not diaphoretic  Psychiatric: She has a normal mood and affect  Her behavior is normal    Vitals reviewed  The history was obtained from the review of the chart and from the patient  Lab Results:    Most recent Alc is  Lab Results   Component Value Date    HGBA1C 5 6 03/19/2018             Recent Results (from the past 23546 hour(s))   Hemoglobin A1C    Collection Time: 10/11/17 12:00 AM   Result Value Ref Range    Hemoglobin A1C 5 7    Microalbumin / creatinine urine ratio    Collection Time: 10/11/17 12:00 AM   Result Value Ref Range    EXT Creatinine Urine 47 7     Microalbum  ,U,Random 3 0 0 0 - 20 0 mg/L    EXTERNAL Microalb/Creat Ratio 3 0    Hemoglobin A1C    Collection Time: 10/11/17 12:00 AM   Result Value Ref Range    Hemoglobin A1C 5 7    Microalbumin / creatinine urine ratio    Collection Time: 10/11/17 12:00 AM   Result Value Ref Range    EXT Creatinine Urine 47 7    Hemoglobin A1C Collection Time: 03/19/18 12:00 AM   Result Value Ref Range    Hemoglobin A1C 5 6      Labs from 07/23/2018:  A1c 5 7, triglycerides 151, total cholesterol 175, HDL 78, LDL 67, glucose 103, BUN 18, creatinine 0 66, GFR 96, sodium 136, potassium 4 5, calcium 9 6, albumin 4 6, liver function within normal limits  No future appointments  Portions of the record may have been created with voice recognition software  Occasional wrong word or "sound a like" substitutions may have occurred due to the inherent limitations of voice recognition software  Read the chart carefully and recognize, using context, where substitutions have occurred

## 2018-11-02 ENCOUNTER — TELEPHONE (OUTPATIENT)
Dept: ENDOCRINOLOGY | Facility: HOSPITAL | Age: 58
End: 2018-11-02

## 2018-11-02 NOTE — TELEPHONE ENCOUNTER
Received notification that patients insurance requires prior auth for Mat Ely, left message to see if the patient has tried any alternatives such as jardiance or invokana

## 2018-11-08 LAB
T4 FREE SERPL-MCNC: 1.11 NG/DL (ref 0.82–1.77)
TSH SERPL DL<=0.005 MIU/L-ACNC: 1.56 UIU/ML (ref 0.45–4.5)

## 2018-11-09 LAB
LEFT EYE DIABETIC RETINOPATHY: NORMAL
RIGHT EYE DIABETIC RETINOPATHY: NORMAL
SEVERITY (EYE EXAM): NORMAL

## 2018-11-15 DIAGNOSIS — E04.2 MULTIPLE THYROID NODULES: ICD-10-CM

## 2019-02-06 DIAGNOSIS — E11.9 TYPE 2 DIABETES MELLITUS WITHOUT COMPLICATION, WITHOUT LONG-TERM CURRENT USE OF INSULIN (HCC): ICD-10-CM

## 2019-02-06 NOTE — TELEPHONE ENCOUNTER
From: Diane Tabor  Sent: 2/6/2019 10:11 AM EST  Subject: Medication Renewal Request    Diane Tabor would like a refill of the following medications:     Exenatide ER (Billye ) 2 MG/0 85ML KONG West, DO]   Patient Comment: [facility]) says I need new script  I have 2 weeks left      Preferred pharmacy: [facility]

## 2019-04-14 DIAGNOSIS — E11.9 TYPE 2 DIABETES MELLITUS WITHOUT COMPLICATION, WITHOUT LONG-TERM CURRENT USE OF INSULIN (HCC): ICD-10-CM

## 2019-04-15 RX ORDER — EXENATIDE 2 MG/.85ML
INJECTION, SUSPENSION, EXTENDED RELEASE SUBCUTANEOUS
Qty: 10.2 ML | Refills: 1 | Status: SHIPPED | OUTPATIENT
Start: 2019-04-15 | End: 2019-09-07 | Stop reason: SDUPTHER

## 2019-05-04 LAB
ALBUMIN SERPL-MCNC: 4.3 G/DL (ref 3.6–4.8)
ALBUMIN/CREAT UR: <12.3 MG/G CREAT (ref 0–30)
ALBUMIN/GLOB SERPL: 1.5 {RATIO} (ref 1.2–2.2)
ALP SERPL-CCNC: 67 IU/L (ref 39–117)
ALT SERPL-CCNC: 22 IU/L (ref 0–32)
AST SERPL-CCNC: 32 IU/L (ref 0–40)
BILIRUB SERPL-MCNC: 0.7 MG/DL (ref 0–1.2)
BUN SERPL-MCNC: 19 MG/DL (ref 8–27)
BUN/CREAT SERPL: 25 (ref 12–28)
CALCIUM SERPL-MCNC: 9.1 MG/DL (ref 8.7–10.3)
CHLORIDE SERPL-SCNC: 102 MMOL/L (ref 96–106)
CHOLEST SERPL-MCNC: 166 MG/DL (ref 100–199)
CO2 SERPL-SCNC: 24 MMOL/L (ref 20–29)
CREAT SERPL-MCNC: 0.75 MG/DL (ref 0.57–1)
CREAT UR-MCNC: 24.4 MG/DL
EST. AVERAGE GLUCOSE BLD GHB EST-MCNC: 126 MG/DL
GLOBULIN SER-MCNC: 2.9 G/DL (ref 1.5–4.5)
GLUCOSE SERPL-MCNC: 107 MG/DL (ref 65–99)
HBA1C MFR BLD: 6 % (ref 4.8–5.6)
HDLC SERPL-MCNC: 75 MG/DL
LDLC SERPL CALC-MCNC: 70 MG/DL (ref 0–99)
LDLC/HDLC SERPL: 0.9 RATIO (ref 0–3.2)
MICROALBUMIN UR-MCNC: <3 UG/ML
POTASSIUM SERPL-SCNC: 4.2 MMOL/L (ref 3.5–5.2)
PROT SERPL-MCNC: 7.2 G/DL (ref 6–8.5)
SL AMB EGFR AFRICAN AMERICAN: 99 ML/MIN/1.73
SL AMB EGFR NON AFRICAN AMERICAN: 86 ML/MIN/1.73
SL AMB VLDL CHOLESTEROL CALC: 21 MG/DL (ref 5–40)
SODIUM SERPL-SCNC: 138 MMOL/L (ref 134–144)
TRIGL SERPL-MCNC: 106 MG/DL (ref 0–149)

## 2019-05-07 ENCOUNTER — OFFICE VISIT (OUTPATIENT)
Dept: ENDOCRINOLOGY | Facility: HOSPITAL | Age: 59
End: 2019-05-07
Payer: COMMERCIAL

## 2019-05-07 VITALS
BODY MASS INDEX: 35.19 KG/M2 | WEIGHT: 224.2 LBS | DIASTOLIC BLOOD PRESSURE: 78 MMHG | SYSTOLIC BLOOD PRESSURE: 120 MMHG | HEIGHT: 67 IN | HEART RATE: 68 BPM

## 2019-05-07 DIAGNOSIS — E78.5 HYPERLIPIDEMIA, UNSPECIFIED HYPERLIPIDEMIA TYPE: ICD-10-CM

## 2019-05-07 DIAGNOSIS — E04.2 MULTIPLE THYROID NODULES: ICD-10-CM

## 2019-05-07 DIAGNOSIS — I10 ESSENTIAL HYPERTENSION: ICD-10-CM

## 2019-05-07 DIAGNOSIS — E11.9 TYPE 2 DIABETES MELLITUS WITHOUT COMPLICATION, WITHOUT LONG-TERM CURRENT USE OF INSULIN (HCC): Primary | ICD-10-CM

## 2019-05-07 PROCEDURE — 99214 OFFICE O/P EST MOD 30 MIN: CPT | Performed by: INTERNAL MEDICINE

## 2019-08-02 ENCOUNTER — TELEPHONE (OUTPATIENT)
Dept: ENDOCRINOLOGY | Facility: CLINIC | Age: 59
End: 2019-08-02

## 2019-09-07 DIAGNOSIS — E11.9 TYPE 2 DIABETES MELLITUS WITHOUT COMPLICATION, WITHOUT LONG-TERM CURRENT USE OF INSULIN (HCC): ICD-10-CM

## 2019-09-08 RX ORDER — EXENATIDE 2 MG/.85ML
INJECTION, SUSPENSION, EXTENDED RELEASE SUBCUTANEOUS
Qty: 10.2 ML | Refills: 0 | Status: SHIPPED | OUTPATIENT
Start: 2019-09-08 | End: 2019-10-27 | Stop reason: SDUPTHER

## 2019-10-24 LAB
ALBUMIN SERPL-MCNC: 4.4 G/DL (ref 3.6–4.8)
ALBUMIN/CREAT UR: <12.1 MG/G CREAT (ref 0–30)
ALBUMIN/GLOB SERPL: 1.4 {RATIO} (ref 1.2–2.2)
ALP SERPL-CCNC: 74 IU/L (ref 39–117)
ALT SERPL-CCNC: 23 IU/L (ref 0–32)
AST SERPL-CCNC: 22 IU/L (ref 0–40)
BASOPHILS # BLD AUTO: 0 X10E3/UL (ref 0–0.2)
BASOPHILS NFR BLD AUTO: 0 %
BILIRUB SERPL-MCNC: 0.9 MG/DL (ref 0–1.2)
BUN SERPL-MCNC: 15 MG/DL (ref 8–27)
BUN/CREAT SERPL: 23 (ref 12–28)
CALCIUM SERPL-MCNC: 9.9 MG/DL (ref 8.7–10.3)
CHLORIDE SERPL-SCNC: 96 MMOL/L (ref 96–106)
CHOLEST SERPL-MCNC: 183 MG/DL (ref 100–199)
CO2 SERPL-SCNC: 25 MMOL/L (ref 20–29)
CREAT SERPL-MCNC: 0.66 MG/DL (ref 0.57–1)
CREAT UR-MCNC: 24.8 MG/DL
EOSINOPHIL # BLD AUTO: 0.2 X10E3/UL (ref 0–0.4)
EOSINOPHIL NFR BLD AUTO: 3 %
ERYTHROCYTE [DISTWIDTH] IN BLOOD BY AUTOMATED COUNT: 14.3 % (ref 12.3–15.4)
EST. AVERAGE GLUCOSE BLD GHB EST-MCNC: 128 MG/DL
GLOBULIN SER-MCNC: 3.1 G/DL (ref 1.5–4.5)
GLUCOSE SERPL-MCNC: 127 MG/DL (ref 65–99)
HBA1C MFR BLD: 6.1 % (ref 4.8–5.6)
HCT VFR BLD AUTO: 43.4 % (ref 34–46.6)
HDLC SERPL-MCNC: 63 MG/DL
HGB BLD-MCNC: 14.7 G/DL (ref 11.1–15.9)
IMM GRANULOCYTES # BLD: 0 X10E3/UL (ref 0–0.1)
IMM GRANULOCYTES NFR BLD: 0 %
LDLC SERPL CALC-MCNC: 67 MG/DL (ref 0–99)
LDLC/HDLC SERPL: 1.1 RATIO (ref 0–3.2)
LYMPHOCYTES # BLD AUTO: 2 X10E3/UL (ref 0.7–3.1)
LYMPHOCYTES NFR BLD AUTO: 30 %
MCH RBC QN AUTO: 32.4 PG (ref 26.6–33)
MCHC RBC AUTO-ENTMCNC: 33.9 G/DL (ref 31.5–35.7)
MCV RBC AUTO: 96 FL (ref 79–97)
MICROALBUMIN UR-MCNC: <3 UG/ML
MONOCYTES # BLD AUTO: 0.5 X10E3/UL (ref 0.1–0.9)
MONOCYTES NFR BLD AUTO: 7 %
NEUTROPHILS # BLD AUTO: 4.1 X10E3/UL (ref 1.4–7)
NEUTROPHILS NFR BLD AUTO: 60 %
PLATELET # BLD AUTO: 259 X10E3/UL (ref 150–450)
POTASSIUM SERPL-SCNC: 4.4 MMOL/L (ref 3.5–5.2)
PROT SERPL-MCNC: 7.5 G/DL (ref 6–8.5)
RBC # BLD AUTO: 4.54 X10E6/UL (ref 3.77–5.28)
SL AMB EGFR AFRICAN AMERICAN: 110 ML/MIN/1.73
SL AMB EGFR NON AFRICAN AMERICAN: 96 ML/MIN/1.73
SL AMB VLDL CHOLESTEROL CALC: 53 MG/DL (ref 5–40)
SODIUM SERPL-SCNC: 140 MMOL/L (ref 134–144)
TRIGL SERPL-MCNC: 263 MG/DL (ref 0–149)
TSH SERPL DL<=0.005 MIU/L-ACNC: 1.75 UIU/ML (ref 0.45–4.5)
WBC # BLD AUTO: 6.8 X10E3/UL (ref 3.4–10.8)

## 2019-10-27 DIAGNOSIS — E11.9 TYPE 2 DIABETES MELLITUS WITHOUT COMPLICATION, WITHOUT LONG-TERM CURRENT USE OF INSULIN (HCC): ICD-10-CM

## 2019-10-28 RX ORDER — EXENATIDE 2 MG/.85ML
INJECTION, SUSPENSION, EXTENDED RELEASE SUBCUTANEOUS
Qty: 10.2 ML | Refills: 0 | Status: SHIPPED | OUTPATIENT
Start: 2019-10-28 | End: 2019-11-08 | Stop reason: SDUPTHER

## 2019-11-08 ENCOUNTER — OFFICE VISIT (OUTPATIENT)
Dept: ENDOCRINOLOGY | Facility: HOSPITAL | Age: 59
End: 2019-11-08
Payer: COMMERCIAL

## 2019-11-08 VITALS
DIASTOLIC BLOOD PRESSURE: 68 MMHG | HEART RATE: 68 BPM | BODY MASS INDEX: 36.63 KG/M2 | WEIGHT: 233.4 LBS | SYSTOLIC BLOOD PRESSURE: 104 MMHG | HEIGHT: 67 IN

## 2019-11-08 DIAGNOSIS — E11.9 TYPE 2 DIABETES MELLITUS WITHOUT COMPLICATION, WITHOUT LONG-TERM CURRENT USE OF INSULIN (HCC): Primary | ICD-10-CM

## 2019-11-08 DIAGNOSIS — E78.5 HYPERLIPIDEMIA, UNSPECIFIED HYPERLIPIDEMIA TYPE: ICD-10-CM

## 2019-11-08 DIAGNOSIS — E04.2 MULTIPLE THYROID NODULES: ICD-10-CM

## 2019-11-08 DIAGNOSIS — I10 ESSENTIAL HYPERTENSION: ICD-10-CM

## 2019-11-08 PROCEDURE — 99214 OFFICE O/P EST MOD 30 MIN: CPT | Performed by: INTERNAL MEDICINE

## 2019-11-08 RX ORDER — LANCETS 30 GAUGE
EACH MISCELLANEOUS
Qty: 200 EACH | Refills: 3 | Status: SHIPPED | OUTPATIENT
Start: 2019-11-08 | End: 2019-11-12

## 2019-11-11 ENCOUNTER — VBI (OUTPATIENT)
Dept: ADMINISTRATIVE | Facility: OTHER | Age: 59
End: 2019-11-11

## 2019-11-12 DIAGNOSIS — E11.9 TYPE 2 DIABETES MELLITUS WITHOUT COMPLICATION, WITHOUT LONG-TERM CURRENT USE OF INSULIN (HCC): Primary | ICD-10-CM

## 2019-11-12 RX ORDER — BLOOD SUGAR DIAGNOSTIC
STRIP MISCELLANEOUS
Qty: 100 EACH | Refills: 3 | Status: SHIPPED | OUTPATIENT
Start: 2019-11-12 | End: 2020-09-25

## 2019-11-12 NOTE — TELEPHONE ENCOUNTER
Spoke with [facility], the patient uses [facility] test strips and also needs a script for [employer]

## 2019-11-22 LAB
LEFT EYE DIABETIC RETINOPATHY: NORMAL
RIGHT EYE DIABETIC RETINOPATHY: NORMAL

## 2020-02-11 ENCOUNTER — DOCUMENTATION (OUTPATIENT)
Dept: ENDOCRINOLOGY | Facility: HOSPITAL | Age: 60
End: 2020-02-11

## 2020-05-15 LAB
ALBUMIN SERPL-MCNC: 4.4 G/DL (ref 3.8–4.8)
ALBUMIN/CREAT UR: 3 MG/G CREAT (ref 0–29)
ALBUMIN/GLOB SERPL: 1.6 {RATIO} (ref 1.2–2.2)
ALP SERPL-CCNC: 67 IU/L (ref 39–117)
ALT SERPL-CCNC: 23 IU/L (ref 0–32)
AST SERPL-CCNC: 22 IU/L (ref 0–40)
BASOPHILS # BLD AUTO: 0 X10E3/UL (ref 0–0.2)
BASOPHILS NFR BLD AUTO: 0 %
BILIRUB SERPL-MCNC: 0.7 MG/DL (ref 0–1.2)
BUN SERPL-MCNC: 17 MG/DL (ref 8–27)
BUN/CREAT SERPL: 23 (ref 12–28)
CALCIUM SERPL-MCNC: 9.2 MG/DL (ref 8.7–10.3)
CHLORIDE SERPL-SCNC: 97 MMOL/L (ref 96–106)
CHOLEST SERPL-MCNC: 157 MG/DL (ref 100–199)
CO2 SERPL-SCNC: 24 MMOL/L (ref 20–29)
CREAT SERPL-MCNC: 0.74 MG/DL (ref 0.57–1)
CREAT UR-MCNC: 92.8 MG/DL
EOSINOPHIL # BLD AUTO: 0.4 X10E3/UL (ref 0–0.4)
EOSINOPHIL NFR BLD AUTO: 5 %
ERYTHROCYTE [DISTWIDTH] IN BLOOD BY AUTOMATED COUNT: 13.1 % (ref 11.7–15.4)
EST. AVERAGE GLUCOSE BLD GHB EST-MCNC: 137 MG/DL
GLOBULIN SER-MCNC: 2.7 G/DL (ref 1.5–4.5)
GLUCOSE SERPL-MCNC: 107 MG/DL (ref 65–99)
HBA1C MFR BLD: 6.4 % (ref 4.8–5.6)
HCT VFR BLD AUTO: 39.4 % (ref 34–46.6)
HDLC SERPL-MCNC: 65 MG/DL
HGB BLD-MCNC: 13.4 G/DL (ref 11.1–15.9)
IMM GRANULOCYTES # BLD: 0 X10E3/UL (ref 0–0.1)
IMM GRANULOCYTES NFR BLD: 0 %
LDLC SERPL CALC-MCNC: 51 MG/DL (ref 0–99)
LDLC/HDLC SERPL: 0.8 RATIO (ref 0–3.2)
LYMPHOCYTES # BLD AUTO: 2.5 X10E3/UL (ref 0.7–3.1)
LYMPHOCYTES NFR BLD AUTO: 37 %
MCH RBC QN AUTO: 31.3 PG (ref 26.6–33)
MCHC RBC AUTO-ENTMCNC: 34 G/DL (ref 31.5–35.7)
MCV RBC AUTO: 92 FL (ref 79–97)
MICROALBUMIN UR-MCNC: 3.2 UG/ML
MONOCYTES # BLD AUTO: 0.5 X10E3/UL (ref 0.1–0.9)
MONOCYTES NFR BLD AUTO: 8 %
NEUTROPHILS # BLD AUTO: 3.3 X10E3/UL (ref 1.4–7)
NEUTROPHILS NFR BLD AUTO: 50 %
PLATELET # BLD AUTO: 244 X10E3/UL (ref 150–450)
POTASSIUM SERPL-SCNC: 4.6 MMOL/L (ref 3.5–5.2)
PROT SERPL-MCNC: 7.1 G/DL (ref 6–8.5)
RBC # BLD AUTO: 4.28 X10E6/UL (ref 3.77–5.28)
SL AMB EGFR AFRICAN AMERICAN: 100 ML/MIN/1.73
SL AMB EGFR NON AFRICAN AMERICAN: 87 ML/MIN/1.73
SL AMB VLDL CHOLESTEROL CALC: 41 MG/DL (ref 5–40)
SODIUM SERPL-SCNC: 137 MMOL/L (ref 134–144)
TRIGL SERPL-MCNC: 203 MG/DL (ref 0–149)
TSH SERPL DL<=0.005 MIU/L-ACNC: 1.43 UIU/ML (ref 0.45–4.5)
WBC # BLD AUTO: 6.6 X10E3/UL (ref 3.4–10.8)

## 2020-05-22 ENCOUNTER — TELEMEDICINE (OUTPATIENT)
Dept: ENDOCRINOLOGY | Facility: HOSPITAL | Age: 60
End: 2020-05-22
Payer: COMMERCIAL

## 2020-05-22 DIAGNOSIS — E78.5 HYPERLIPIDEMIA, UNSPECIFIED HYPERLIPIDEMIA TYPE: ICD-10-CM

## 2020-05-22 DIAGNOSIS — E11.9 TYPE 2 DIABETES MELLITUS WITHOUT COMPLICATION, WITHOUT LONG-TERM CURRENT USE OF INSULIN (HCC): Primary | ICD-10-CM

## 2020-05-22 DIAGNOSIS — E04.2 MULTIPLE THYROID NODULES: ICD-10-CM

## 2020-05-22 DIAGNOSIS — I10 ESSENTIAL HYPERTENSION: ICD-10-CM

## 2020-05-22 PROCEDURE — 99213 OFFICE O/P EST LOW 20 MIN: CPT | Performed by: INTERNAL MEDICINE

## 2020-09-13 DIAGNOSIS — E11.9 TYPE 2 DIABETES MELLITUS WITHOUT COMPLICATION, WITHOUT LONG-TERM CURRENT USE OF INSULIN (HCC): ICD-10-CM

## 2020-09-18 DIAGNOSIS — E11.9 TYPE 2 DIABETES MELLITUS WITHOUT COMPLICATION, WITHOUT LONG-TERM CURRENT USE OF INSULIN (HCC): Primary | ICD-10-CM

## 2020-09-24 DIAGNOSIS — E11.9 TYPE 2 DIABETES MELLITUS WITHOUT COMPLICATION, WITHOUT LONG-TERM CURRENT USE OF INSULIN (HCC): ICD-10-CM

## 2020-09-25 RX ORDER — BLOOD SUGAR DIAGNOSTIC
STRIP MISCELLANEOUS
Qty: 100 EACH | Refills: 3 | Status: SHIPPED | OUTPATIENT
Start: 2020-09-25 | End: 2021-08-15

## 2020-10-09 DIAGNOSIS — E11.9 TYPE 2 DIABETES MELLITUS WITHOUT COMPLICATION, WITHOUT LONG-TERM CURRENT USE OF INSULIN (HCC): ICD-10-CM

## 2020-10-09 RX ORDER — EXENATIDE 2 MG/.85ML
INJECTION, SUSPENSION, EXTENDED RELEASE SUBCUTANEOUS
Qty: 12 PEN | Refills: 3 | Status: SHIPPED | OUTPATIENT
Start: 2020-10-09 | End: 2020-12-02

## 2020-11-17 LAB
ALBUMIN/CREAT UR: <4 MG/G CREAT (ref 0–29)
CREAT UR-MCNC: 71 MG/DL
EST. AVERAGE GLUCOSE BLD GHB EST-MCNC: 151 MG/DL
HBA1C MFR BLD: 6.9 % (ref 4.8–5.6)
MICROALBUMIN UR-MCNC: <3 UG/ML
TSH SERPL DL<=0.005 MIU/L-ACNC: 1.05 UIU/ML (ref 0.45–4.5)

## 2020-12-02 ENCOUNTER — OFFICE VISIT (OUTPATIENT)
Dept: ENDOCRINOLOGY | Facility: HOSPITAL | Age: 60
End: 2020-12-02
Payer: COMMERCIAL

## 2020-12-02 VITALS
BODY MASS INDEX: 37.45 KG/M2 | WEIGHT: 238.6 LBS | DIASTOLIC BLOOD PRESSURE: 78 MMHG | HEIGHT: 67 IN | SYSTOLIC BLOOD PRESSURE: 128 MMHG | HEART RATE: 64 BPM

## 2020-12-02 DIAGNOSIS — E11.9 TYPE 2 DIABETES MELLITUS WITHOUT COMPLICATION, WITHOUT LONG-TERM CURRENT USE OF INSULIN (HCC): Primary | ICD-10-CM

## 2020-12-02 DIAGNOSIS — E04.2 MULTIPLE THYROID NODULES: ICD-10-CM

## 2020-12-02 DIAGNOSIS — I10 ESSENTIAL HYPERTENSION: ICD-10-CM

## 2020-12-02 DIAGNOSIS — E78.5 HYPERLIPIDEMIA, UNSPECIFIED HYPERLIPIDEMIA TYPE: ICD-10-CM

## 2020-12-02 PROCEDURE — 99214 OFFICE O/P EST MOD 30 MIN: CPT | Performed by: INTERNAL MEDICINE

## 2020-12-02 RX ORDER — DULAGLUTIDE 1.5 MG/.5ML
INJECTION, SOLUTION SUBCUTANEOUS
Qty: 12 PEN | Refills: 0 | Status: SHIPPED | OUTPATIENT
Start: 2020-12-02 | End: 2021-02-03

## 2020-12-02 RX ORDER — DEXAMETHASONE 1 MG
TABLET ORAL
Qty: 1 TABLET | Refills: 0 | Status: SHIPPED | OUTPATIENT
Start: 2020-12-02 | End: 2021-04-14

## 2020-12-04 LAB
LEFT EYE DIABETIC RETINOPATHY: NORMAL
RIGHT EYE DIABETIC RETINOPATHY: NORMAL

## 2020-12-23 LAB
CORTIS AM PEAK SERPL-MCNC: 0.9 UG/DL (ref 6.2–19.4)
DEXAMETHASONE SERPL-MCNC: 174 NG/DL

## 2021-02-03 DIAGNOSIS — E11.9 TYPE 2 DIABETES MELLITUS WITHOUT COMPLICATION, WITHOUT LONG-TERM CURRENT USE OF INSULIN (HCC): ICD-10-CM

## 2021-02-03 RX ORDER — DULAGLUTIDE 1.5 MG/.5ML
INJECTION, SOLUTION SUBCUTANEOUS
Qty: 6 ML | Refills: 3 | Status: SHIPPED | OUTPATIENT
Start: 2021-02-03 | End: 2022-01-03

## 2021-03-10 DIAGNOSIS — Z23 ENCOUNTER FOR IMMUNIZATION: ICD-10-CM

## 2021-04-09 LAB
ALBUMIN SERPL-MCNC: 4.5 G/DL (ref 3.8–4.8)
ALBUMIN/CREAT UR: <26 MG/G CREAT (ref 0–29)
ALBUMIN/GLOB SERPL: 1.4 {RATIO} (ref 1.2–2.2)
ALP SERPL-CCNC: 74 IU/L (ref 39–117)
ALT SERPL-CCNC: 32 IU/L (ref 0–32)
AST SERPL-CCNC: 27 IU/L (ref 0–40)
BASOPHILS # BLD AUTO: 0 X10E3/UL (ref 0–0.2)
BASOPHILS NFR BLD AUTO: 0 %
BILIRUB SERPL-MCNC: 0.8 MG/DL (ref 0–1.2)
BUN SERPL-MCNC: 16 MG/DL (ref 8–27)
BUN/CREAT SERPL: 24 (ref 12–28)
CALCIUM SERPL-MCNC: 10.1 MG/DL (ref 8.7–10.3)
CHLORIDE SERPL-SCNC: 98 MMOL/L (ref 96–106)
CHOLEST SERPL-MCNC: 192 MG/DL (ref 100–199)
CO2 SERPL-SCNC: 27 MMOL/L (ref 20–29)
CREAT SERPL-MCNC: 0.67 MG/DL (ref 0.57–1)
CREAT UR-MCNC: 11.7 MG/DL
EOSINOPHIL # BLD AUTO: 0.4 X10E3/UL (ref 0–0.4)
EOSINOPHIL NFR BLD AUTO: 5 %
ERYTHROCYTE [DISTWIDTH] IN BLOOD BY AUTOMATED COUNT: 12.5 % (ref 11.7–15.4)
EST. AVERAGE GLUCOSE BLD GHB EST-MCNC: 146 MG/DL
GLOBULIN SER-MCNC: 3.2 G/DL (ref 1.5–4.5)
GLUCOSE SERPL-MCNC: 114 MG/DL (ref 65–99)
HBA1C MFR BLD: 6.7 % (ref 4.8–5.6)
HCT VFR BLD AUTO: 41.7 % (ref 34–46.6)
HDLC SERPL-MCNC: 62 MG/DL
HGB BLD-MCNC: 13.8 G/DL (ref 11.1–15.9)
IMM GRANULOCYTES # BLD: 0 X10E3/UL (ref 0–0.1)
IMM GRANULOCYTES NFR BLD: 0 %
LDLC SERPL CALC-MCNC: 87 MG/DL (ref 0–99)
LDLC/HDLC SERPL: 1.4 RATIO (ref 0–3.2)
LYMPHOCYTES # BLD AUTO: 3.4 X10E3/UL (ref 0.7–3.1)
LYMPHOCYTES NFR BLD AUTO: 41 %
MCH RBC QN AUTO: 30.5 PG (ref 26.6–33)
MCHC RBC AUTO-ENTMCNC: 33.1 G/DL (ref 31.5–35.7)
MCV RBC AUTO: 92 FL (ref 79–97)
MICROALBUMIN UR-MCNC: <3 UG/ML
MONOCYTES # BLD AUTO: 0.5 X10E3/UL (ref 0.1–0.9)
MONOCYTES NFR BLD AUTO: 6 %
NEUTROPHILS # BLD AUTO: 3.9 X10E3/UL (ref 1.4–7)
NEUTROPHILS NFR BLD AUTO: 48 %
PLATELET # BLD AUTO: 285 X10E3/UL (ref 150–450)
POTASSIUM SERPL-SCNC: 4.4 MMOL/L (ref 3.5–5.2)
PROT SERPL-MCNC: 7.7 G/DL (ref 6–8.5)
RBC # BLD AUTO: 4.53 X10E6/UL (ref 3.77–5.28)
SL AMB EGFR AFRICAN AMERICAN: 108 ML/MIN/1.73
SL AMB EGFR NON AFRICAN AMERICAN: 94 ML/MIN/1.73
SL AMB VLDL CHOLESTEROL CALC: 43 MG/DL (ref 5–40)
SODIUM SERPL-SCNC: 138 MMOL/L (ref 134–144)
TRIGL SERPL-MCNC: 261 MG/DL (ref 0–149)
TSH SERPL DL<=0.005 MIU/L-ACNC: 1.03 UIU/ML (ref 0.45–4.5)
WBC # BLD AUTO: 8.2 X10E3/UL (ref 3.4–10.8)

## 2021-04-14 ENCOUNTER — OFFICE VISIT (OUTPATIENT)
Dept: ENDOCRINOLOGY | Facility: HOSPITAL | Age: 61
End: 2021-04-14
Payer: COMMERCIAL

## 2021-04-14 VITALS
SYSTOLIC BLOOD PRESSURE: 108 MMHG | HEART RATE: 64 BPM | DIASTOLIC BLOOD PRESSURE: 68 MMHG | BODY MASS INDEX: 35.79 KG/M2 | WEIGHT: 228 LBS | HEIGHT: 67 IN

## 2021-04-14 DIAGNOSIS — E78.5 HYPERLIPIDEMIA, UNSPECIFIED HYPERLIPIDEMIA TYPE: ICD-10-CM

## 2021-04-14 DIAGNOSIS — E04.2 MULTIPLE THYROID NODULES: ICD-10-CM

## 2021-04-14 DIAGNOSIS — E11.9 TYPE 2 DIABETES MELLITUS WITHOUT COMPLICATION, WITHOUT LONG-TERM CURRENT USE OF INSULIN (HCC): Primary | ICD-10-CM

## 2021-04-14 DIAGNOSIS — I10 ESSENTIAL HYPERTENSION: ICD-10-CM

## 2021-04-14 PROCEDURE — 99214 OFFICE O/P EST MOD 30 MIN: CPT | Performed by: INTERNAL MEDICINE

## 2021-04-14 NOTE — PROGRESS NOTES
4/14/2021    Assessment/Plan      Diagnoses and all orders for this visit:    Type 2 diabetes mellitus without complication, without long-term current use of insulin (HCC)  -     Hemoglobin A1C; Future  -     Comprehensive metabolic panel; Future  -     Microalbumin / creatinine urine ratio; Future  -     Hemoglobin A1C  -     Comprehensive metabolic panel  -     Microalbumin / creatinine urine ratio  -     metFORMIN (GLUCOPHAGE) 500 mg tablet; 1 tab qam and 2 qpm    Multiple thyroid nodules  -     TSH, 3rd generation; Future  -     TSH, 3rd generation    Essential hypertension    Hyperlipidemia, unspecified hyperlipidemia type  -     Lipid Panel with Direct LDL reflex; Future  -     Lipid Panel with Direct LDL reflex        Assessment/Plan:    1  Type 2 diabetes:  Overall I believe this is well controlled  The patient asked about increasing metformin which I think is reasonable and we will increase to 2000 mg total per day  Continue current Trulicity  Follow-up in 4 months  2  Thyroid nodules:  She will go for thyroid ultrasound at her convenience and comfort level given being on immune suppressing medication and the current pandemic  3  Hypertension: Continue current regimen  4  Hyperlipidemia: Continue current regimen and repeat lipid panel prior to next appointment  CC: Diabetes Consult    History of Present Illness     HPI: Navi Pruitt is a 59y o  year old female with type 2 diabetes for about 8 years  She is on oral agents at home and takes   Metformin 500 mg in the am and 2 tab qpm,   Trulicity 1 5 mg weekly  She denies any polyuria, polydipsia, nocturia and blurry vision  She denies neuropathy, nephropathy and retinopathy  Hypoglycemic episodes: No       The patient's last eye exam was in  December 2020  The patient's last foot exam was in  December 2020 in our office      Blood Sugar/Glucometer/Pump/CGM review: Blood sugars checked once a day over the past few weeks have ranged from about 82 - 144  No hypoglycemia  She also has history of thyroid nodules with a negative biopsy in the fall of 2017 at [facility]  She has not gone for updated thyroid ultrasound  For her history of hypertension she is treated with lisinopril and metoprolol  Hyperlipidemia she continues on atorvastatin 40 mg daily  Review of Systems   Constitutional: Negative for fatigue  HENT: Negative for trouble swallowing and voice change  Eyes: Negative for visual disturbance  Respiratory: Negative for shortness of breath  Cardiovascular: Negative for palpitations and leg swelling  Gastrointestinal: Negative for abdominal pain, nausea and vomiting  Endocrine: Negative for polydipsia and polyuria  Musculoskeletal: Negative for arthralgias and myalgias  Skin: Negative for rash  Neurological: Negative for dizziness, tremors and weakness  Hematological: Negative for adenopathy  Psychiatric/Behavioral: Negative for agitation and confusion  Historical Information   History reviewed  No pertinent past medical history  History reviewed  No pertinent surgical history    Social History   Social History     Substance and Sexual Activity   Alcohol Use No     Social History     Substance and Sexual Activity   Drug Use No     Social History     Tobacco Use   Smoking Status Never Smoker   Smokeless Tobacco Never Used     Family History:   Family History   Problem Relation Age of Onset    Stroke Mother     Dementia Mother     Diabetes unspecified Father     Heart disease Father     Arthritis Family     Cancer Family     Diabetes unspecified Family     Heart disease Family        Meds/Allergies   Current Outpatient Medications   Medication Sig Dispense Refill    acetaminophen (TYLENOL) 500 mg tablet Take 500 mg by mouth every 6 (six) hours as needed for mild pain      Alpha-Lipoic Acid 100 MG CAPS Take by mouth      aspirin (ECOTRIN LOW STRENGTH) 81 mg EC tablet Take 81 mg by mouth daily      atorvastatin (LIPITOR) 40 mg tablet Take 40 mg by mouth daily      B-D TB SYRINGE 1CC/26GX3/8" 26G X 3/8" 1 ML MISC INJECT AS DIRECTED  5    Antoinette Microlet Lancets lancets Test once daily 100 each 3    Calcium Polycarbophil (FIBER-CAPS PO) Take by mouth      Cholecalciferol (VITAMIN D) 2000 units CAPS Take by mouth      Contour Next Test test strip TEST ONCE DAILY 100 each 3    Diclofenac Sodium 1 % CREA Place on the skin      Docusate Calcium (STOOL SOFTENER PO) Take by mouth      Dulaglutide (Trulicity) 1 5 TR/3 8DU SOPN INJECT THE CONTENTS OF 1  PEN (1 5 MG) SUBCUTANEOUSLY WEEKLY AS DIRECTED 6 mL 3    DULoxetine (CYMBALTA) 60 mg delayed release capsule Take 60 mg by mouth daily      Flaxseed, Linseed, (FLAX SEED OIL PO) Take by mouth      folic acid (FOLVITE) 1 mg tablet Take 3 mg by mouth daily      furosemide (LASIX) 20 mg tablet Take 20 mg by mouth daily      Andra, Zingiber officinalis, (ANDRA PO) Take by mouth      inFLIXimab (REMICADE) 100 mg Infuse into a venous catheter      KRILL OIL PO Take by mouth      leucovorin (WELLCOVORIN) 5 mg tablet       lisinopril (ZESTRIL) 10 mg tablet Take 10 mg by mouth daily      meloxicam (MOBIC) 15 mg tablet Take 15 mg by mouth daily      metFORMIN (GLUCOPHAGE) 500 mg tablet 1 tab qam and 2 qpm 270 tablet 3    methotrexate 50 MG/2ML injection once a week  1    metoprolol tartrate (LOPRESSOR) 25 mg tablet Take 25 mg by mouth 2 (two) times a day      potassium chloride (MICRO-K) 10 MEQ CR capsule Take 20 mEq by mouth 2 (two) times a day        No current facility-administered medications for this visit        Allergies   Allergen Reactions    Digoxin Nausea Only, GI Intolerance and Vomiting     Other reaction(s): stomach upset    Other      Other reaction(s): vomiting    Prochlorperazine      Other reaction(s): stomach upset       Objective   Vitals: Blood pressure 108/68, pulse 64, height 5' 7 25" (1 708 m), weight 103 kg (228 lb)  Invasive Devices     None                 Physical Exam  Vitals signs reviewed  Constitutional:       General: She is not in acute distress  Appearance: She is well-developed  She is not diaphoretic  HENT:      Head: Normocephalic and atraumatic  Eyes:      Conjunctiva/sclera: Conjunctivae normal       Pupils: Pupils are equal, round, and reactive to light  Neck:      Musculoskeletal: Normal range of motion and neck supple  Thyroid: No thyromegaly  Cardiovascular:      Rate and Rhythm: Normal rate and regular rhythm  Pulmonary:      Effort: Pulmonary effort is normal       Breath sounds: Normal breath sounds  Abdominal:      General: Bowel sounds are normal       Palpations: Abdomen is soft  Musculoskeletal: Normal range of motion  Skin:     General: Skin is warm and dry  Findings: No rash  Neurological:      Mental Status: She is alert and oriented to person, place, and time  Motor: No abnormal muscle tone  Psychiatric:         Behavior: Behavior normal          The history was obtained from the review of the chart and from the patient  Lab Results:    Most recent Alc is  Lab Results   Component Value Date    HGBA1C 6 7 (H) 04/08/2021           No components found for: HA1C  No components found for: GLU    Lab Results   Component Value Date    CREATININE 0 67 04/08/2021    CREATININE 0 74 05/14/2020    CREATININE 0 66 10/23/2019    BUN 16 04/08/2021    K 4 4 04/08/2021    CL 98 04/08/2021    CO2 27 04/08/2021     No results found for: EGFR  No components found for: [facility]    Lab Results   Component Value Date    HDL 62 04/08/2021    TRIG 261 (H) 04/08/2021       Lab Results   Component Value Date    ALT 32 04/08/2021    AST 27 04/08/2021       Lab Results   Component Value Date    TSH 1 030 04/08/2021    FREET4 1 11 11/07/2018           No future appointments  Portions of the record may have been created with voice recognition software   Occasional wrong word or "sound a like" substitutions may have occurred due to the inherent limitations of voice recognition software  Read the chart carefully and recognize, using context, where substitutions have occurred

## 2021-04-15 ENCOUNTER — TELEPHONE (OUTPATIENT)
Dept: ADMINISTRATIVE | Facility: OTHER | Age: 61
End: 2021-04-15

## 2021-04-15 NOTE — TELEPHONE ENCOUNTER
----- Message from Johanna James sent at 4/14/2021  3:30 PM EDT -----  Regarding: colon cancer screening  04/14/21 3:35 PM    Hello, our patient Alicia Quinn has had a colon cancer screening completed/performed  Please assist in updating the patient chart by patient's PCP Lashon Baires  The date of service is 2019      Thank you,  Dana Cote MA  [facility] [place]

## 2021-04-26 NOTE — TELEPHONE ENCOUNTER
Upon review of the In Basket request we were able to locate, review, and update the patient chart as requested for CRC: Cologuard  I was able to speak with [facility] who stated that they have a Cologuard completed in 12/2018 but no colonoscopy  [facility] will fax over results  Any additional questions or concerns should be emailed to the Practice Liaisons via [email or web address] email, please do not reply via In Basket      Thank you  Madelyn Coronado

## 2021-04-26 NOTE — TELEPHONE ENCOUNTER
Upon review of the In Basket request we were able to locate, review, and update the patient chart as requested for CRC: Lilian  Any additional questions or concerns should be emailed to the Practice Liaisons via [email or web address] email, please do not reply via In Basket      Thank you  Bernie Vidal

## 2021-06-08 LAB
LEFT EYE DIABETIC RETINOPATHY: NORMAL
RIGHT EYE DIABETIC RETINOPATHY: NORMAL

## 2021-08-06 LAB
ALBUMIN SERPL-MCNC: 4.7 G/DL (ref 3.8–4.8)
ALBUMIN/CREAT UR: <14 MG/G CREAT (ref 0–29)
ALBUMIN/GLOB SERPL: 1.6 {RATIO} (ref 1.2–2.2)
ALP SERPL-CCNC: 65 IU/L (ref 48–121)
ALT SERPL-CCNC: 23 IU/L (ref 0–32)
AST SERPL-CCNC: 24 IU/L (ref 0–40)
BILIRUB SERPL-MCNC: 0.9 MG/DL (ref 0–1.2)
BUN SERPL-MCNC: 18 MG/DL (ref 8–27)
BUN/CREAT SERPL: 24 (ref 12–28)
CALCIUM SERPL-MCNC: 9.9 MG/DL (ref 8.7–10.3)
CHLORIDE SERPL-SCNC: 98 MMOL/L (ref 96–106)
CHOLEST SERPL-MCNC: 172 MG/DL (ref 100–199)
CO2 SERPL-SCNC: 25 MMOL/L (ref 20–29)
CREAT SERPL-MCNC: 0.74 MG/DL (ref 0.57–1)
CREAT UR-MCNC: 21.9 MG/DL
EST. AVERAGE GLUCOSE BLD GHB EST-MCNC: 137 MG/DL
GLOBULIN SER-MCNC: 2.9 G/DL (ref 1.5–4.5)
GLUCOSE SERPL-MCNC: 128 MG/DL (ref 65–99)
HBA1C MFR BLD: 6.4 % (ref 4.8–5.6)
HDLC SERPL-MCNC: 60 MG/DL
LDLC SERPL CALC-MCNC: 79 MG/DL (ref 0–99)
LDLC/HDLC SERPL: 1.3 RATIO (ref 0–3.2)
MICROALBUMIN UR-MCNC: <3 UG/ML
POTASSIUM SERPL-SCNC: 4.9 MMOL/L (ref 3.5–5.2)
PROT SERPL-MCNC: 7.6 G/DL (ref 6–8.5)
SL AMB EGFR AFRICAN AMERICAN: 100 ML/MIN/1.73
SL AMB EGFR NON AFRICAN AMERICAN: 86 ML/MIN/1.73
SL AMB VLDL CHOLESTEROL CALC: 33 MG/DL (ref 5–40)
SODIUM SERPL-SCNC: 136 MMOL/L (ref 134–144)
TRIGL SERPL-MCNC: 196 MG/DL (ref 0–149)
TSH SERPL DL<=0.005 MIU/L-ACNC: 1.73 UIU/ML (ref 0.45–4.5)

## 2021-08-08 DIAGNOSIS — E11.9 TYPE 2 DIABETES MELLITUS WITHOUT COMPLICATION, WITHOUT LONG-TERM CURRENT USE OF INSULIN (HCC): ICD-10-CM

## 2021-08-09 RX ORDER — LANCETS
EACH MISCELLANEOUS
Qty: 100 EACH | Refills: 3 | Status: SHIPPED | OUTPATIENT
Start: 2021-08-09 | End: 2022-07-21

## 2021-08-14 DIAGNOSIS — E11.9 TYPE 2 DIABETES MELLITUS WITHOUT COMPLICATION, WITHOUT LONG-TERM CURRENT USE OF INSULIN (HCC): ICD-10-CM

## 2021-08-15 RX ORDER — BLOOD SUGAR DIAGNOSTIC
STRIP MISCELLANEOUS
Qty: 100 STRIP | Refills: 3 | Status: SHIPPED | OUTPATIENT
Start: 2021-08-15

## 2021-08-24 ENCOUNTER — OFFICE VISIT (OUTPATIENT)
Dept: ENDOCRINOLOGY | Facility: HOSPITAL | Age: 61
End: 2021-08-24
Payer: COMMERCIAL

## 2021-08-24 VITALS
HEIGHT: 67 IN | DIASTOLIC BLOOD PRESSURE: 58 MMHG | SYSTOLIC BLOOD PRESSURE: 108 MMHG | BODY MASS INDEX: 34.21 KG/M2 | HEART RATE: 64 BPM | WEIGHT: 218 LBS

## 2021-08-24 DIAGNOSIS — E11.9 TYPE 2 DIABETES MELLITUS WITHOUT COMPLICATION, WITHOUT LONG-TERM CURRENT USE OF INSULIN (HCC): Primary | ICD-10-CM

## 2021-08-24 DIAGNOSIS — E78.5 HYPERLIPIDEMIA, UNSPECIFIED HYPERLIPIDEMIA TYPE: ICD-10-CM

## 2021-08-24 DIAGNOSIS — I10 ESSENTIAL HYPERTENSION: ICD-10-CM

## 2021-08-24 DIAGNOSIS — E04.2 MULTIPLE THYROID NODULES: ICD-10-CM

## 2021-08-24 PROCEDURE — 99214 OFFICE O/P EST MOD 30 MIN: CPT | Performed by: PHYSICIAN ASSISTANT

## 2021-08-24 NOTE — PATIENT INSTRUCTIONS
Continue to monitor diet, and maintain physical activity  Continue with metformin 1000 mg twice a day, and Trulicity 1 5 mg weekly  Get ultrasound of thyroid completed at earliest convenience  Follow-up in 4 months with lab work completed prior to visit

## 2021-08-24 NOTE — PROGRESS NOTES
Shayan Leslie 59 y o  female MRN: [id number]    Encounter: [id number]      Assessment/Plan     Assessment: This is a 59y o -year-old female with type 2 diabetes with hypertension and hyperlipidemia, and thyroid nodules  Plan:  1  Type 2 diabetes:  A1c has decreased to 6 4  She has personally trying to get into the 5s  Did discuss possibly increasing her Trulicity to 3 mg weekly, but she wants to hold off at this time due to cost medication  She will continue with metformin 1000 mg twice a day  Continue to monitor blood sugar  Contact the office if there is any concerns for consistent hypoglycemia or hyperglycemia  Follow-up in 3 months with lab work completed prior to visit  2  Thyroid nodules:  She will go for thyroid ultrasound at her convenience and comfort level given being on immune suppressing medication and the current pandemic  3  Hypertension:  Normotensive in the office today  Kidney function remained stable with normal electrolytes  Repeat CMP prior to next office visit  4  Hyperlipidemia:  Lipid panel was excellent  Continue with current medications  Will continue monitor at this time  CC:  Type 2 diabetes and thyroid nodule follow-up    History of Present Illness     HPI:  Shayan Leslie is a 59year old female with type 2 diabetes for about 8 years  She is on oral agents at home and takes Metformin 1000 mg in the am and pm, Trulicity 1 5 mg weekly  She denies any polyuria, polydipsia, nocturia and blurry vision  She denies neuropathy, nephropathy and retinopathy  She is continuing to watch her diet, and increase physical activity  Is able to continue to lose weight  Is trying to get her A1c into the 5s  Denies any new chronic medical conditions       Hypoglycemic episodes: No      The patient's last eye exam was in  December 2020    The patient's last foot exam was in  December 2020 in our office      Blood Sugar/Glucometer/Pump/CGM review:   Does check blood sugars varying times throughout the day  Typically blood sugars range from   This is either before or after meals  Occasionally blood sugars in the high 100s  No hypoglycemia      She also has history of thyroid nodules with a negative biopsy in the fall of 2017 at [facility]  She has not gone for updated thyroid ultrasound        For her history of hypertension she is treated with lisinopril and metoprolol  Hyperlipidemia she continues on atorvastatin 40 mg daily  Denies any headaches, dizziness, MI or stroke-like symptoms  Review of Systems   Constitutional: Negative for activity change, appetite change, fatigue and unexpected weight change  HENT: Negative for sore throat and trouble swallowing  Eyes: Negative for visual disturbance  Respiratory: Negative for chest tightness and shortness of breath  Cardiovascular: Negative for chest pain, palpitations and leg swelling  Gastrointestinal: Negative for abdominal pain, constipation, diarrhea, nausea and vomiting  Endocrine: Negative for cold intolerance, heat intolerance, polydipsia, polyphagia and polyuria  Genitourinary: Negative for frequency  Musculoskeletal: Positive for arthralgias and gait problem  Skin: Negative for wound  Neurological: Negative for dizziness, weakness, numbness and headaches  Psychiatric/Behavioral: Negative for dysphoric mood and sleep disturbance  The patient is not nervous/anxious  Historical Information   History reviewed  No pertinent past medical history  History reviewed  No pertinent surgical history    Social History   Social History     Substance and Sexual Activity   Alcohol Use No     Social History     Substance and Sexual Activity   Drug Use No     Social History     Tobacco Use   Smoking Status Never Smoker   Smokeless Tobacco Never Used     Family History:   Family History   Problem Relation Age of Onset    Stroke Mother     Dementia Mother     Diabetes unspecified Father     Heart disease Father     Arthritis Family     Cancer Family     Diabetes unspecified Family     Heart disease Family        Meds/Allergies   Current Outpatient Medications   Medication Sig Dispense Refill    acetaminophen (TYLENOL) 500 mg tablet Take 500 mg by mouth every 6 (six) hours as needed for mild pain      Alpha-Lipoic Acid 100 MG CAPS Take by mouth      aspirin (ECOTRIN LOW STRENGTH) 81 mg EC tablet Take 81 mg by mouth daily      atorvastatin (LIPITOR) 40 mg tablet Take 40 mg by mouth daily      B-D TB SYRINGE 1CC/26GX3/8" 26G X 3/8" 1 ML MISC INJECT AS DIRECTED  5    Calcium Polycarbophil (FIBER-CAPS PO) Take by mouth      Cholecalciferol (VITAMIN D) 2000 units CAPS Take by mouth      Contour Next Test test strip TEST ONCE DAILY 100 strip 3    Diclofenac Sodium 1 % CREA Place on the skin      Docusate Calcium (STOOL SOFTENER PO) Take by mouth      Dulaglutide (Trulicity) 1 5 YZ/3 7AR SOPN INJECT THE CONTENTS OF 1  PEN (1 5 MG) SUBCUTANEOUSLY WEEKLY AS DIRECTED 6 mL 3    DULoxetine (CYMBALTA) 60 mg delayed release capsule Take 60 mg by mouth daily      Flaxseed, Linseed, (FLAX SEED OIL PO) Take by mouth      folic acid (FOLVITE) 1 mg tablet Take 3 mg by mouth daily      furosemide (LASIX) 20 mg tablet Take 20 mg by mouth daily      Ginger, Zingiber officinalis, (GINGER PO) Take by mouth      inFLIXimab (REMICADE) 100 mg Infuse into a venous catheter      KRILL OIL PO Take by mouth      leucovorin (WELLCOVORIN) 5 mg tablet       lisinopril (ZESTRIL) 10 mg tablet Take 10 mg by mouth daily      meloxicam (MOBIC) 15 mg tablet Take 15 mg by mouth daily      metFORMIN (GLUCOPHAGE) 500 mg tablet 2 tab qam and 2 qpm 360 tablet 3    methotrexate 50 MG/2ML injection once a week  1    metoprolol tartrate (LOPRESSOR) 25 mg tablet Take 25 mg by mouth 2 (two) times a day      Microlet Lancets MISC TEST ONCE DAILY 100 each 3    potassium chloride (MICRO-K) 10 MEQ CR capsule Take 20 mEq by mouth 2 (two) times a day        No current facility-administered medications for this visit  Allergies   Allergen Reactions    Digoxin Nausea Only, GI Intolerance and Vomiting     Other reaction(s): stomach upset    Other      Other reaction(s): vomiting    Prochlorperazine      Other reaction(s): stomach upset       Objective   Vitals: There were no vitals taken for this visit  Physical Exam  Vitals and nursing note reviewed  Constitutional:       General: She is not in acute distress  HENT:      Head: Normocephalic and atraumatic  Eyes:      General: No scleral icterus  Extraocular Movements: Extraocular movements intact  Conjunctiva/sclera: Conjunctivae normal       Pupils: Pupils are equal, round, and reactive to light  Neck:      Thyroid: Thyroid mass present  Cardiovascular:      Rate and Rhythm: Normal rate and regular rhythm  Heart sounds: No murmur heard  Pulmonary:      Effort: Pulmonary effort is normal  No respiratory distress  Breath sounds: Normal breath sounds  No wheezing  Musculoskeletal:      Cervical back: Normal range of motion  Right lower leg: No edema  Left lower leg: No edema  Lymphadenopathy:      Cervical: No cervical adenopathy  Neurological:      Mental Status: She is alert and oriented to person, place, and time  Mental status is at baseline  Sensory: No sensory deficit  Gait: Gait abnormal (  Utilizes cane)  Psychiatric:         Mood and Affect: Mood normal          Behavior: Behavior normal          Thought Content: Thought content normal          The history was obtained from the review of the chart, patient      Lab Results:   Lab Results   Component Value Date/Time    Hemoglobin A1C 6 4 (H) 08/05/2021 09:57 AM    Hemoglobin A1C 6 7 (H) 04/08/2021 12:19 PM    Hemoglobin A1C 6 9 (H) 11/16/2020 09:17 AM    White Blood Cell Count 8 2 04/08/2021 12:19 PM    Hemoglobin 13 8 04/08/2021 12:19 PM    HCT 41 7 04/08/2021 12:19 PM MCV 92 04/08/2021 12:19 PM    Platelet Count 594 96/35/9366 12:19 PM    BUN 18 08/05/2021 09:57 AM    BUN 16 04/08/2021 12:19 PM    Potassium 4 9 08/05/2021 09:57 AM    Potassium 4 4 04/08/2021 12:19 PM    Chloride 98 08/05/2021 09:57 AM    Chloride 98 04/08/2021 12:19 PM    CO2 25 08/05/2021 09:57 AM    CO2 27 04/08/2021 12:19 PM    Creatinine 0 74 08/05/2021 09:57 AM    Creatinine 0 67 04/08/2021 12:19 PM    AST 24 08/05/2021 09:57 AM    AST 27 04/08/2021 12:19 PM    ALT 23 08/05/2021 09:57 AM    ALT 32 04/08/2021 12:19 PM    Albumin 4 7 08/05/2021 09:57 AM    Albumin 4 5 04/08/2021 12:19 PM    Globulin, Total 2 9 08/05/2021 09:57 AM    Globulin, Total 3 2 04/08/2021 12:19 PM    HDL 60 08/05/2021 09:57 AM    HDL 62 04/08/2021 12:19 PM    Triglycerides 196 (H) 08/05/2021 09:57 AM    Triglycerides 261 (H) 04/08/2021 12:19 PM         Portions of the record may have been created with voice recognition software  Occasional wrong word or "sound a like" substitutions may have occurred due to the inherent limitations of voice recognition software  Read the chart carefully and recognize, using context, where substitutions have occurred

## 2021-11-18 LAB
EST. AVERAGE GLUCOSE BLD GHB EST-MCNC: 140 MG/DL
HBA1C MFR BLD: 6.5 % (ref 4.8–5.6)
TSH SERPL DL<=0.005 MIU/L-ACNC: 2.03 UIU/ML (ref 0.45–4.5)

## 2021-11-30 ENCOUNTER — OFFICE VISIT (OUTPATIENT)
Dept: ENDOCRINOLOGY | Facility: HOSPITAL | Age: 61
End: 2021-11-30
Payer: COMMERCIAL

## 2021-11-30 VITALS
DIASTOLIC BLOOD PRESSURE: 68 MMHG | WEIGHT: 217 LBS | HEIGHT: 67 IN | SYSTOLIC BLOOD PRESSURE: 118 MMHG | BODY MASS INDEX: 34.06 KG/M2 | HEART RATE: 66 BPM

## 2021-11-30 DIAGNOSIS — E04.2 MULTIPLE THYROID NODULES: ICD-10-CM

## 2021-11-30 DIAGNOSIS — E78.5 HYPERLIPIDEMIA, UNSPECIFIED HYPERLIPIDEMIA TYPE: ICD-10-CM

## 2021-11-30 DIAGNOSIS — I10 ESSENTIAL HYPERTENSION: ICD-10-CM

## 2021-11-30 DIAGNOSIS — E11.9 TYPE 2 DIABETES MELLITUS WITHOUT COMPLICATION, WITHOUT LONG-TERM CURRENT USE OF INSULIN (HCC): Primary | ICD-10-CM

## 2021-11-30 PROCEDURE — 99214 OFFICE O/P EST MOD 30 MIN: CPT | Performed by: PHYSICIAN ASSISTANT

## 2021-12-30 DIAGNOSIS — E11.9 TYPE 2 DIABETES MELLITUS WITHOUT COMPLICATION, WITHOUT LONG-TERM CURRENT USE OF INSULIN (HCC): ICD-10-CM

## 2022-01-03 RX ORDER — DULAGLUTIDE 1.5 MG/.5ML
INJECTION, SOLUTION SUBCUTANEOUS
Qty: 6 ML | Refills: 3 | Status: SHIPPED | OUTPATIENT
Start: 2022-01-03

## 2022-01-29 DIAGNOSIS — E11.9 TYPE 2 DIABETES MELLITUS WITHOUT COMPLICATION, WITHOUT LONG-TERM CURRENT USE OF INSULIN (HCC): ICD-10-CM

## 2022-03-12 LAB
ALBUMIN SERPL-MCNC: 4.3 G/DL (ref 3.8–4.8)
ALBUMIN/GLOB SERPL: 1.6 {RATIO} (ref 1.2–2.2)
ALP SERPL-CCNC: 60 IU/L (ref 44–121)
ALT SERPL-CCNC: 15 IU/L (ref 0–32)
AST SERPL-CCNC: 12 IU/L (ref 0–40)
BILIRUB SERPL-MCNC: 1 MG/DL (ref 0–1.2)
BUN SERPL-MCNC: 17 MG/DL (ref 8–27)
BUN/CREAT SERPL: 26 (ref 12–28)
CALCIUM SERPL-MCNC: 9.9 MG/DL (ref 8.7–10.3)
CHLORIDE SERPL-SCNC: 99 MMOL/L (ref 96–106)
CO2 SERPL-SCNC: 25 MMOL/L (ref 20–29)
CREAT SERPL-MCNC: 0.65 MG/DL (ref 0.57–1)
EGFR: 98 ML/MIN/1.73
EST. AVERAGE GLUCOSE BLD GHB EST-MCNC: 134 MG/DL
GLOBULIN SER-MCNC: 2.7 G/DL (ref 1.5–4.5)
GLUCOSE SERPL-MCNC: 125 MG/DL (ref 65–99)
HBA1C MFR BLD: 6.3 % (ref 4.8–5.6)
POTASSIUM SERPL-SCNC: 4.6 MMOL/L (ref 3.5–5.2)
PROT SERPL-MCNC: 7 G/DL (ref 6–8.5)
SODIUM SERPL-SCNC: 137 MMOL/L (ref 134–144)
TSH SERPL DL<=0.005 MIU/L-ACNC: 1.05 UIU/ML (ref 0.45–4.5)

## 2022-04-05 ENCOUNTER — OFFICE VISIT (OUTPATIENT)
Dept: ENDOCRINOLOGY | Facility: HOSPITAL | Age: 62
End: 2022-04-05
Payer: COMMERCIAL

## 2022-04-05 VITALS
OXYGEN SATURATION: 96 % | HEART RATE: 63 BPM | DIASTOLIC BLOOD PRESSURE: 80 MMHG | WEIGHT: 219 LBS | SYSTOLIC BLOOD PRESSURE: 118 MMHG | HEIGHT: 67 IN | BODY MASS INDEX: 34.37 KG/M2

## 2022-04-05 DIAGNOSIS — E78.5 HYPERLIPIDEMIA, UNSPECIFIED HYPERLIPIDEMIA TYPE: ICD-10-CM

## 2022-04-05 DIAGNOSIS — E11.9 TYPE 2 DIABETES MELLITUS WITHOUT COMPLICATION, WITHOUT LONG-TERM CURRENT USE OF INSULIN (HCC): Primary | ICD-10-CM

## 2022-04-05 DIAGNOSIS — E04.2 MULTIPLE THYROID NODULES: ICD-10-CM

## 2022-04-05 DIAGNOSIS — I10 ESSENTIAL HYPERTENSION: ICD-10-CM

## 2022-04-05 PROCEDURE — 99214 OFFICE O/P EST MOD 30 MIN: CPT | Performed by: PHYSICIAN ASSISTANT

## 2022-04-05 NOTE — PROGRESS NOTES
Roman Walls 57 y o  female MRN: [id number]    Encounter: [id number]      Assessment/Plan     Assessment: This is a 57y o -year-old female with type 2 diabetes with hypertension and hyperlipidemia, and thyroid nodules  Plan:  1  Type 2 diabetes: most recent hemoglobin A1c remains relatively stable at 6 3  She is still trying to get her A1c into the 5s  No adjustments will be made to her medication at this time  She will continue with lifestyle modifications  She will try to increase frequency of checking her glucose levels  At this time she will follow-up in 4 months with lab work completed prior to visit      2  Thyroid nodules:  No recent thyroid ultrasound was completed  Has not been having any neck compressive symptoms  Would recommend getting ultrasound completed at earliest convenience      3  Hypertension:  Normotensive in the office today  Mary Jane Branham function remained stable with normal electrolytes   Repeat CMP prior to next office visit      4  Hyperlipidemia:   Recent lab work shows increase in triglycerides   Continue with current medications, and lifestyle modifications   Will continue monitor at this time  CC:  Type 2 diabetes and thyroid nodule follow-up    History of Present Illness     HPI:  Dipti Boateng a 59year old female with type 2 diabetes for about 8 years   She is on oral agents at home and takes Metformin 1000 mg in the am and pm, Trulicity 1 5 mg weekly  She denies any polyuria, polydipsia, nocturia and blurry vision   She denies neuropathy, nephropathy and retinopathy   She is continuing to watch her diet, and increase physical activity  Is currently on the exercise bike at least 30 minutes a day  Physical activity is somewhat limited by her rheumatoid arthritis   Is able to continue to lose weight   Is trying to get her A1c into the 5s   Denies any new chronic medical conditions  Is routinely following up with cardiology and rheumatology    Has been under lot of stress recently with her 2 jobs and flare-up of her arthritis, otherwise is doing well      Hypoglycemic episodes: No      The patient's last eye exam was in June 2021  Ramon Weeks patient's last foot exam was in [facility] 2020 in our office      Blood Sugar/Glucometer/Pump/CGM review:  Is checking blood sugar 1 to 2 times a day at variable times  Fasting blood sugars are typically between 100 and 130  Later in the evening blood sugars tend to be between 90 and 110  A few other random blood sugars do appear to be between 100 and 160       She also has history of thyroid nodules with a negative biopsy in the fall of 2017 at [facility],THE has not gone for updated thyroid ultrasound        For her history of hypertension she is treated with lisinopril and metoprolol   Hyperlipidemia she continues on atorvastatin 40 mg daily   Denies any headaches, dizziness, MI or stroke-like symptoms  Review of Systems   Constitutional: Negative for activity change, appetite change, fatigue and unexpected weight change  HENT: Negative for sore throat and trouble swallowing  Eyes: Negative for visual disturbance  Respiratory: Negative for chest tightness and shortness of breath  Cardiovascular: Negative for chest pain, palpitations and leg swelling  Gastrointestinal: Negative for abdominal pain, constipation, diarrhea, nausea and vomiting  Endocrine: Negative for cold intolerance, heat intolerance, polydipsia, polyphagia and polyuria  Genitourinary: Negative for frequency  Musculoskeletal: Positive for arthralgias and gait problem (Utilizes a cane)  Skin: Negative for wound  Neurological: Negative for dizziness, weakness, numbness and headaches  Psychiatric/Behavioral: Negative for dysphoric mood and sleep disturbance  The patient is not nervous/anxious  Historical Information   No past medical history on file  No past surgical history on file    Social History   Social History     Substance and Sexual Activity   Alcohol Use No     Social History     Substance and Sexual Activity   Drug Use No     Social History     Tobacco Use   Smoking Status Never Smoker   Smokeless Tobacco Never Used     Family History:   Family History   Problem Relation Age of Onset    Stroke Mother     Dementia Mother     Diabetes unspecified Father     Heart disease Father     Arthritis Family     Cancer Family     Diabetes unspecified Family     Heart disease Family        Meds/Allergies   Current Outpatient Medications   Medication Sig Dispense Refill    acetaminophen (TYLENOL) 500 mg tablet Take 500 mg by mouth every 6 (six) hours as needed for mild pain      Alpha-Lipoic Acid 100 MG CAPS Take by mouth      aspirin (ECOTRIN LOW STRENGTH) 81 mg EC tablet Take 81 mg by mouth daily      atorvastatin (LIPITOR) 40 mg tablet Take 40 mg by mouth daily      B-D TB SYRINGE 1CC/26GX3/8" 26G X 3/8" 1 ML MISC INJECT AS DIRECTED  5    Calcium Polycarbophil (FIBER-CAPS PO) Take by mouth      Cholecalciferol (VITAMIN D) 2000 units CAPS Take by mouth      Contour Next Test test strip TEST ONCE DAILY 100 strip 3    Diclofenac Sodium 1 % CREA Place on the skin      Docusate Calcium (STOOL SOFTENER PO) Take by mouth      Dulaglutide (Trulicity) 1 5 TY/3 9LP SOPN INJECT THE CONTENTS OF 1  PEN SUBCUTANEOUSLY WEEKLY  AS DIRECTED 6 mL 3    DULoxetine (CYMBALTA) 60 mg delayed release capsule Take 60 mg by mouth daily      Flaxseed, Linseed, (FLAX SEED OIL PO) Take by mouth      folic acid (FOLVITE) 1 mg tablet Take 3 mg by mouth daily      furosemide (LASIX) 20 mg tablet Take 20 mg by mouth daily      Ginger, Zingiber officinalis, (GINGER PO) Take by mouth      inFLIXimab (REMICADE) 100 mg Infuse into a venous catheter      KRILL OIL PO Take by mouth      leucovorin (WELLCOVORIN) 5 mg tablet       lisinopril (ZESTRIL) 10 mg tablet Take 10 mg by mouth daily      meloxicam (MOBIC) 15 mg tablet Take 15 mg by mouth daily      metFORMIN (GLUCOPHAGE) 500 mg tablet TAKE 2 TABLETS BY MOUTH IN  THE MORNING AND 2 TABLETS  IN THE EVENING 360 tablet 3    methotrexate 50 MG/2ML injection once a week  1    metoprolol tartrate (LOPRESSOR) 25 mg tablet Take 25 mg by mouth 2 (two) times a day      Microlet Lancets MISC TEST ONCE DAILY 100 each 3    potassium chloride (MICRO-K) 10 MEQ CR capsule Take 20 mEq by mouth 2 (two) times a day        No current facility-administered medications for this visit  Allergies   Allergen Reactions    Digoxin Nausea Only, GI Intolerance and Vomiting     Other reaction(s): stomach upset    Other      Other reaction(s): vomiting    Prochlorperazine      Other reaction(s): stomach upset       Objective   Vitals: There were no vitals taken for this visit  Physical Exam  Vitals and nursing note reviewed  Constitutional:       General: She is not in acute distress  HENT:      Head: Normocephalic and atraumatic  Eyes:      General: No scleral icterus  Extraocular Movements: Extraocular movements intact  Conjunctiva/sclera: Conjunctivae normal       Pupils: Pupils are equal, round, and reactive to light  Cardiovascular:      Rate and Rhythm: Normal rate and regular rhythm  Heart sounds: No murmur heard  Pulmonary:      Effort: Pulmonary effort is normal  No respiratory distress  Breath sounds: Normal breath sounds  No wheezing  Musculoskeletal:      Cervical back: Normal range of motion  Right lower leg: No edema  Left lower leg: No edema  Lymphadenopathy:      Cervical: No cervical adenopathy  Neurological:      Mental Status: She is alert and oriented to person, place, and time  Mental status is at baseline  Sensory: No sensory deficit  Gait: Gait abnormal (Utilizes a cane)  Psychiatric:         Mood and Affect: Mood normal          Behavior: Behavior normal          Thought Content:  Thought content normal          The history was obtained from the review of the chart, patient  Lab Results:   Lab Results   Component Value Date/Time    Hemoglobin A1C 6 3 (H) 03/11/2022 11:25 AM    Hemoglobin A1C 6 5 (H) 11/17/2021 09:00 AM    Hemoglobin A1C 6 4 (H) 08/05/2021 09:57 AM    White Blood Cell Count 8 2 04/08/2021 12:19 PM    Hemoglobin 13 8 04/08/2021 12:19 PM    HCT 41 7 04/08/2021 12:19 PM    MCV 92 04/08/2021 12:19 PM    Platelet Count 358 13/04/8178 12:19 PM    BUN 17 03/11/2022 11:25 AM    BUN 18 08/05/2021 09:57 AM    BUN 16 04/08/2021 12:19 PM    Potassium 4 6 03/11/2022 11:25 AM    Potassium 4 9 08/05/2021 09:57 AM    Potassium 4 4 04/08/2021 12:19 PM    Chloride 99 03/11/2022 11:25 AM    Chloride 98 08/05/2021 09:57 AM    Chloride 98 04/08/2021 12:19 PM    CO2 25 03/11/2022 11:25 AM    CO2 25 08/05/2021 09:57 AM    CO2 27 04/08/2021 12:19 PM    Creatinine 0 65 03/11/2022 11:25 AM    Creatinine 0 74 08/05/2021 09:57 AM    Creatinine 0 67 04/08/2021 12:19 PM    AST 12 03/11/2022 11:25 AM    AST 24 08/05/2021 09:57 AM    AST 27 04/08/2021 12:19 PM    ALT 15 03/11/2022 11:25 AM    ALT 23 08/05/2021 09:57 AM    ALT 32 04/08/2021 12:19 PM    Albumin 4 3 03/11/2022 11:25 AM    Albumin 4 7 08/05/2021 09:57 AM    Albumin 4 5 04/08/2021 12:19 PM    Globulin, Total 2 7 03/11/2022 11:25 AM    Globulin, Total 2 9 08/05/2021 09:57 AM    Globulin, Total 3 2 04/08/2021 12:19 PM    HDL 60 08/05/2021 09:57 AM    HDL 62 04/08/2021 12:19 PM    Triglycerides 196 (H) 08/05/2021 09:57 AM    Triglycerides 261 (H) 04/08/2021 12:19 PM       Portions of the record may have been created with voice recognition software  Occasional wrong word or "sound a like" substitutions may have occurred due to the inherent limitations of voice recognition software  Read the chart carefully and recognize, using context, where substitutions have occurred

## 2022-07-12 NOTE — PROGRESS NOTES
11/8/2019    Assessment/Plan      Diagnoses and all orders for this visit:    Type 2 diabetes mellitus without complication, without long-term current use of insulin (HCC)  -     Hemoglobin A1C; Future  -     Comprehensive metabolic panel; Future  -     CBC and differential; Future  -     Microalbumin / creatinine urine ratio; Future  -     TSH, 3rd generation; Future  -     Hemoglobin A1C  -     Comprehensive metabolic panel  -     CBC and differential  -     Microalbumin / creatinine urine ratio  -     TSH, 3rd generation  -     metFORMIN (GLUCOPHAGE) 500 mg tablet; Take 1 tablet (500 mg total) by mouth 2 (two) times a day with meals  -     Exenatide ER (BYDUREON BCISE) 2 MG/0 85ML AUIJ; 2 mg weekly  -     Glucometer test strips  -     Lancets MISC; Twice daily    Multiple thyroid nodules  -     US thyroid; Future    Essential hypertension    Hyperlipidemia, unspecified hyperlipidemia type  -     Lipid Panel with Direct LDL reflex; Future  -     Lipid Panel with Direct LDL reflex    Other orders  -     Diclofenac Sodium 1 % CREA; Place on the skin        Assessment/Plan:  1  Type 2 diabetes:  Remains controlled on current regimen  Follow-up in 6 months with labs as ordered above just prior  2  Thyroid nodules:  She did not have a repeat thyroid ultrasound  We will have this done before her next appointment which will be in 6 months  3  Hyperlipidemia:  Continue current regimen  4  Hypertension:  Continue current regimen  Normotensive the office today  CC: Diabetes Consult    History of Present Illness     HPI: Eileen Lang is a 62y o  year old female with type 2 diabetes for 7 years  She is on oral agents at home and takes metformin 500 mg bid, Bydureon 2 mg weekly  She denies any polyuria, polydipsia, nocturia and blurry vision  She denies neuropathy, nephropathy and retinopathy  Hypoglycemic episodes: No   No logs to review today        The patient has her next eye exam coming up this Friday  The patient's last foot exam was in May 2019 in our office  Blood Sugar/Glucometer/Pump/CGM review: No logs to review  She also has a history of thyroid nodules with negative biopsy in the fall of 2017 at [facility]   For hyperlipidemia she continues on atorvastatin 40 mg daily  For hypertension she continues on metoprolol as well as lisinopril  Review of Systems   Constitutional: Negative for fatigue  HENT: Negative for trouble swallowing and voice change  Eyes: Negative for visual disturbance  Respiratory: Negative for shortness of breath  Cardiovascular: Negative for palpitations and leg swelling  Gastrointestinal: Negative for abdominal pain, nausea and vomiting  Endocrine: Negative for polydipsia and polyuria  Musculoskeletal: Negative for arthralgias and myalgias  Skin: Negative for rash  Neurological: Negative for dizziness, tremors and weakness  Hematological: Negative for adenopathy  Psychiatric/Behavioral: Negative for agitation and confusion  Historical Information   History reviewed  No pertinent past medical history  History reviewed  No pertinent surgical history    Social History   Social History     Substance and Sexual Activity   Alcohol Use No     Social History     Substance and Sexual Activity   Drug Use No     Social History     Tobacco Use   Smoking Status Never Smoker   Smokeless Tobacco Never Used     Family History:   Family History   Problem Relation Age of Onset    Stroke Mother     Dementia Mother     Diabetes unspecified Father     Heart disease Father     Arthritis Family     Cancer Family     Diabetes unspecified Family     Heart disease Family        Meds/Allergies   Current Outpatient Medications   Medication Sig Dispense Refill    acetaminophen (TYLENOL) 500 mg tablet Take 500 mg by mouth every 6 (six) hours as needed for mild pain      Alpha-Lipoic Acid 100 MG CAPS Take by mouth      aspirin (ECOTRIN LOW STRENGTH) 81 mg EC tablet Take 81 mg by mouth daily      atorvastatin (LIPITOR) 40 mg tablet Take 40 mg by mouth daily      B-D TB SYRINGE 1CC/26GX3/8" 26G X 3/8" 1 ML MISC INJECT AS DIRECTED  5    Calcium Polycarbophil (FIBER-CAPS PO) Take by mouth      Cholecalciferol (VITAMIN D) 2000 units CAPS Take by mouth      Diclofenac Sodium 1 % CREA Place on the skin      Docusate Calcium (STOOL SOFTENER PO) Take by mouth      DULoxetine (CYMBALTA) 60 mg delayed release capsule Take 60 mg by mouth daily      Exenatide ER (BYDUREON BCISE) 2 MG/0 85ML AUIJ 2 mg weekly 12 pen 3    Flaxseed, Linseed, (FLAX SEED OIL PO) Take by mouth      folic acid (FOLVITE) 1 mg tablet Take 3 mg by mouth daily      furosemide (LASIX) 20 mg tablet Take 20 mg by mouth daily      Andra, Zingiber officinalis, (ANDRA PO) Take by mouth      inFLIXimab (REMICADE) 100 mg Infuse into a venous catheter      KRILL OIL PO Take by mouth      leucovorin (WELLCOVORIN) 5 mg tablet       lisinopril (ZESTRIL) 10 mg tablet Take 10 mg by mouth daily      meloxicam (MOBIC) 15 mg tablet Take 15 mg by mouth daily      metFORMIN (GLUCOPHAGE) 500 mg tablet Take 1 tablet (500 mg total) by mouth 2 (two) times a day with meals 180 tablet 3    methotrexate 50 MG/2ML injection once a week  1    metoprolol tartrate (LOPRESSOR) 25 mg tablet Take 25 mg by mouth 2 (two) times a day      potassium chloride (MICRO-K) 10 MEQ CR capsule Take 20 mEq by mouth 2 (two) times a day       gabapentin (NEURONTIN) 300 mg capsule Take 100 mg by mouth 3 (three) times a day      Lancets MISC Twice daily 200 each 3     No current facility-administered medications for this visit        Allergies   Allergen Reactions    Digoxin Nausea Only, GI Intolerance and Vomiting     Other reaction(s): stomach upset    Other      Other reaction(s): vomiting    Prochlorperazine      Other reaction(s): stomach upset       Objective   Vitals: Blood pressure 104/68, pulse 68, height 5' 7" (1 702 m), weight 106 kg (233 lb 6 4 oz)  Invasive Devices     None                 Physical Exam   Constitutional: She is oriented to person, place, and time  She appears well-developed and well-nourished  No distress  HENT:   Head: Normocephalic and atraumatic  Neck: Normal range of motion  Neck supple  No thyromegaly present  Cardiovascular: Normal rate and regular rhythm  Pulmonary/Chest: Effort normal and breath sounds normal  No respiratory distress  Abdominal: Soft  Bowel sounds are normal    Musculoskeletal: Normal range of motion  She exhibits no edema  Neurological: She is alert and oriented to person, place, and time  She exhibits normal muscle tone  Skin: Skin is warm and dry  No rash noted  She is not diaphoretic  Psychiatric: She has a normal mood and affect  Her behavior is normal    Vitals reviewed  The history was obtained from the review of the chart and from the patient      Lab Results:    Most recent Alc is  Lab Results   Component Value Date    HGBA1C 6 1 (H) 10/23/2019           No components found for: HA1C  No components found for: GLU    Lab Results   Component Value Date    CREATININE 0 66 10/23/2019    CREATININE 0 75 05/03/2019    BUN 15 10/23/2019    K 4 4 10/23/2019    CL 96 10/23/2019    CO2 25 10/23/2019     No results found for: EGFR  No components found for: [facility]    Lab Results   Component Value Date    HDL 63 10/23/2019    TRIG 263 (H) 10/23/2019       Lab Results   Component Value Date    ALT 23 10/23/2019    AST 22 10/23/2019       Lab Results   Component Value Date    TSH 1 750 10/23/2019    FREET4 1 11 11/07/2018       Recent Results (from the past 24740 hour(s))   TSH, 3rd generation Lab Collect    Collection Time: 11/07/18  9:59 AM   Result Value Ref Range    TSH 1 560 0 450 - 4 500 uIU/mL   T4, free Lab Collect    Collection Time: 11/07/18  9:59 AM   Result Value Ref Range    Free t4 1 11 0 82 - 1 77 ng/dL   Comprehensive metabolic panel Lab Collect Collection Time: 05/03/19 10:41 AM   Result Value Ref Range    Glucose, Random 107 (H) 65 - 99 mg/dL    BUN 19 8 - 27 mg/dL    Creatinine 0 75 0 57 - 1 00 mg/dL    eGFR Non  86 >59 mL/min/1 73    eGFR  99 >59 mL/min/1 73    SL AMB BUN/CREATININE RATIO 25 12 - 28    Sodium 138 134 - 144 mmol/L    Potassium 4 2 3 5 - 5 2 mmol/L    Chloride 102 96 - 106 mmol/L    CO2 24 20 - 29 mmol/L    CALCIUM 9 1 8 7 - 10 3 mg/dL    Protein, Total 7 2 6 0 - 8 5 g/dL    Albumin 4 3 3 6 - 4 8 g/dL    Globulin, Total 2 9 1 5 - 4 5 g/dL    Albumin/Globulin Ratio 1 5 1 2 - 2 2    TOTAL BILIRUBIN 0 7 0 0 - 1 2 mg/dL    Alk Phos Isoenzymes 67 39 - 117 IU/L    AST 32 0 - 40 IU/L    ALT 22 0 - 32 IU/L   HEMOGLOBIN A1C W/ EAG ESTIMATION Lab Collect    Collection Time: 05/03/19 10:41 AM   Result Value Ref Range    Hemoglobin A1C 6 0 (H) 4 8 - 5 6 %    Estimated Average Glucose 126 mg/dL   Lipid Panel with Direct LDL reflex Lab Collect    Collection Time: 05/03/19 10:41 AM   Result Value Ref Range    Cholesterol, Total 166 100 - 199 mg/dL    Triglycerides 106 0 - 149 mg/dL    HDL 75 >39 mg/dL    VLDL Cholesterol Calculated 21 5 - 40 mg/dL    LDL Direct 70 0 - 99 mg/dL    LDl/HDL Ratio 0 9 0 0 - 3 2 ratio   Microalbumin / creatinine urine ratio- Lab Collect    Collection Time: 05/03/19 10:41 AM   Result Value Ref Range    Creatinine, Urine 24 4 Not Estab  mg/dL    Microalbum  ,U,Random <3 0 Not Estab  ug/mL    Microalb/Creat Ratio <12 3 0 0 - 30 0 mg/g creat   HEMOGLOBIN A1C W/ EAG ESTIMATION Lab Collect    Collection Time: 10/23/19  9:20 AM   Result Value Ref Range    Hemoglobin A1C 6 1 (H) 4 8 - 5 6 %    Estimated Average Glucose 128 mg/dL   Comprehensive metabolic panel Lab Collect    Collection Time: 10/23/19  9:20 AM   Result Value Ref Range    Glucose, Random 127 (H) 65 - 99 mg/dL    BUN 15 8 - 27 mg/dL    Creatinine 0 66 0 57 - 1 00 mg/dL    eGFR Non  96 >59 mL/min/1 73    eGFR  American 110 >59 mL/min/1 73    SL AMB BUN/CREATININE RATIO 23 12 - 28    Sodium 140 134 - 144 mmol/L    Potassium 4 4 3 5 - 5 2 mmol/L    Chloride 96 96 - 106 mmol/L    CO2 25 20 - 29 mmol/L    CALCIUM 9 9 8 7 - 10 3 mg/dL    Protein, Total 7 5 6 0 - 8 5 g/dL    Albumin 4 4 3 6 - 4 8 g/dL    Globulin, Total 3 1 1 5 - 4 5 g/dL    Albumin/Globulin Ratio 1 4 1 2 - 2 2    TOTAL BILIRUBIN 0 9 0 0 - 1 2 mg/dL    Alk Phos Isoenzymes 74 39 - 117 IU/L    AST 22 0 - 40 IU/L    ALT 23 0 - 32 IU/L   TSH, 3rd generation Lab Collect    Collection Time: 10/23/19  9:20 AM   Result Value Ref Range    TSH 1 750 0 450 - 4 500 uIU/mL   Microalbumin / creatinine urine ratio- Lab Collect    Collection Time: 10/23/19  9:20 AM   Result Value Ref Range    Creatinine, Urine 24 8 Not Estab  mg/dL    Microalbum  ,U,Random <3 0 Not Estab  ug/mL    Microalb/Creat Ratio <12 1 0 0 - 30 0 mg/g creat   Lipid Panel with Direct LDL reflex Lab Collect    Collection Time: 10/23/19  9:20 AM   Result Value Ref Range    Cholesterol, Total 183 100 - 199 mg/dL    Triglycerides 263 (H) 0 - 149 mg/dL    HDL 63 >39 mg/dL    VLDL Cholesterol Calculated 53 (H) 5 - 40 mg/dL    LDL Direct 67 0 - 99 mg/dL    LDl/HDL Ratio 1 1 0 0 - 3 2 ratio   CBC and differential- Lab Collect    Collection Time: 10/23/19  9:20 AM   Result Value Ref Range    White Blood Cell Count 6 8 3 4 - 10 8 x10E3/uL    Red Blood Cell Count 4 54 3 77 - 5 28 x10E6/uL    Hemoglobin 14 7 11 1 - 15 9 g/dL    HCT 43 4 34 0 - 46 6 %    MCV 96 79 - 97 fL    MCH 32 4 26 6 - 33 0 pg    MCHC 33 9 31 5 - 35 7 g/dL    RDW 14 3 12 3 - 15 4 %    Platelet Count 572 938 - 450 x10E3/uL    Neutrophils 60 Not Estab  %    Lymphocytes 30 Not Estab  %    Monocytes 7 Not Estab  %    Eosinophils 3 Not Estab  %    Basophils PCT 0 Not Estab  %    Neutrophils (Absolute) 4 1 1 4 - 7 0 x10E3/uL    Lymphocytes (Absolute) 2 0 0 7 - 3 1 x10E3/uL    Monocytes (Absolute) 0 5 0 1 - 0 9 x10E3/uL    Eosinophils (Absolute) 0 2 0 0 - 0 4 x10E3/uL    Basophils ABS 0 0 0 0 - 0 2 x10E3/uL    Immature Granulocytes 0 Not Estab  %    Immature Granulocytes (Absolute) 0 0 0 0 - 0 1 x10E3/uL         No future appointments  Portions of the record may have been created with voice recognition software  Occasional wrong word or "sound a like" substitutions may have occurred due to the inherent limitations of voice recognition software  Read the chart carefully and recognize, using context, where substitutions have occurred  [FreeTextEntry2] : new pt is here for bilateral knees

## 2022-07-21 DIAGNOSIS — E11.9 TYPE 2 DIABETES MELLITUS WITHOUT COMPLICATION, WITHOUT LONG-TERM CURRENT USE OF INSULIN (HCC): ICD-10-CM

## 2022-07-21 RX ORDER — LANCETS
EACH MISCELLANEOUS
Qty: 100 EACH | Refills: 3 | Status: SHIPPED | OUTPATIENT
Start: 2022-07-21

## 2022-08-03 LAB
ALBUMIN SERPL-MCNC: 4.5 G/DL (ref 3.8–4.8)
ALBUMIN/CREAT UR: 15 MG/G CREAT (ref 0–29)
ALBUMIN/GLOB SERPL: 1.6 {RATIO} (ref 1.2–2.2)
ALP SERPL-CCNC: 58 IU/L (ref 44–121)
ALT SERPL-CCNC: 15 IU/L (ref 0–32)
AST SERPL-CCNC: 15 IU/L (ref 0–40)
BILIRUB SERPL-MCNC: 1 MG/DL (ref 0–1.2)
BUN SERPL-MCNC: 17 MG/DL (ref 8–27)
BUN/CREAT SERPL: 27 (ref 12–28)
CALCIUM SERPL-MCNC: 9.4 MG/DL (ref 8.7–10.3)
CHLORIDE SERPL-SCNC: 99 MMOL/L (ref 96–106)
CHOLEST SERPL-MCNC: 138 MG/DL (ref 100–199)
CHOLEST/HDLC SERPL: 2.5 RATIO (ref 0–4.4)
CO2 SERPL-SCNC: 26 MMOL/L (ref 20–29)
CREAT SERPL-MCNC: 0.63 MG/DL (ref 0.57–1)
CREAT UR-MCNC: 46 MG/DL
EGFR: 99 ML/MIN/1.73
EST. AVERAGE GLUCOSE BLD GHB EST-MCNC: 143 MG/DL
GLOBULIN SER-MCNC: 2.8 G/DL (ref 1.5–4.5)
GLUCOSE SERPL-MCNC: 127 MG/DL (ref 65–99)
HBA1C MFR BLD: 6.6 % (ref 4.8–5.6)
HDLC SERPL-MCNC: 56 MG/DL
LDLC SERPL CALC-MCNC: 60 MG/DL (ref 0–99)
MICROALBUMIN UR-MCNC: 6.7 UG/ML
POTASSIUM SERPL-SCNC: 4.4 MMOL/L (ref 3.5–5.2)
PROT SERPL-MCNC: 7.3 G/DL (ref 6–8.5)
SL AMB VLDL CHOLESTEROL CALC: 22 MG/DL (ref 5–40)
SODIUM SERPL-SCNC: 138 MMOL/L (ref 134–144)
TRIGL SERPL-MCNC: 128 MG/DL (ref 0–149)
TSH SERPL DL<=0.005 MIU/L-ACNC: 1.15 UIU/ML (ref 0.45–4.5)

## 2022-08-16 ENCOUNTER — OFFICE VISIT (OUTPATIENT)
Dept: ENDOCRINOLOGY | Facility: HOSPITAL | Age: 62
End: 2022-08-16
Payer: COMMERCIAL

## 2022-08-16 VITALS
SYSTOLIC BLOOD PRESSURE: 128 MMHG | HEART RATE: 63 BPM | OXYGEN SATURATION: 96 % | HEIGHT: 67 IN | WEIGHT: 218 LBS | DIASTOLIC BLOOD PRESSURE: 72 MMHG | BODY MASS INDEX: 34.21 KG/M2

## 2022-08-16 DIAGNOSIS — E78.5 HYPERLIPIDEMIA, UNSPECIFIED HYPERLIPIDEMIA TYPE: ICD-10-CM

## 2022-08-16 DIAGNOSIS — E11.9 TYPE 2 DIABETES MELLITUS WITHOUT COMPLICATION, WITHOUT LONG-TERM CURRENT USE OF INSULIN (HCC): Primary | ICD-10-CM

## 2022-08-16 DIAGNOSIS — I10 ESSENTIAL HYPERTENSION: ICD-10-CM

## 2022-08-16 DIAGNOSIS — E04.2 MULTIPLE THYROID NODULES: ICD-10-CM

## 2022-08-16 PROCEDURE — 99214 OFFICE O/P EST MOD 30 MIN: CPT | Performed by: PHYSICIAN ASSISTANT

## 2022-08-16 NOTE — PROGRESS NOTES
Anthony Chowdhury 57 y o  female MRN: [id number]    Encounter: [id number]      Assessment/Plan     Assessment: This is a 57y o -year-old female with type 2 diabetes with hypertension and hyperlipidemia, and thyroid nodules  Plan:  1  Type 2 diabetes:  Most recent hemoglobin A1c remains relatively stable at 6 6  Did discuss possible options of making adjustments to medication, but that this time she is fine with the current regimen  She will continue with metformin 1000 mg twice a day and Trulicity 1 5 mg weekly  Continue with lifestyle modifications to help improve glucose levels  Continue checking blood sugars 2 more times a day  Contact the office with any concerns or questions  Follow-up in 4 months with lab work completed prior to visit      2  Thyroid nodules:  No recent thyroid ultrasound was completed  Has not been having any neck compressive symptoms  Would recommend getting ultrasound completed at earliest convenience      3  Hypertension:  Normotensive in the office today  Tarry Ro function remained stable with normal electrolytes   Repeat CMP prior to next office visit      4  Hyperlipidemia:  Recent lipid panel looks excellent  Continue with atorvastatin 40 mg daily  Will continue monitor over time  CC:  Type 2 diabetes and thyroid nodule follow-up    History of Present Illness     HPI:  Kev Romero Y 11 [employer] old female with type 2 diabetes for about 9 years   She is on oral agents at home and takes Metformin 1000 mg in the am and pm, Trulicity 1 5 mg weekly  She denies any polyuria, polydipsia, nocturia and blurry vision   She denies neuropathy, nephropathy and retinopathy   She is continuing to watch her diet is looking in to doing weight watchers again  Physical activity has been a little less this time around due to increased demand at her 2 jobs  Physical activity is somewhat limited by her rheumatoid arthritis    Is trying to continue to lose weight   Is trying to get her A1c into the 5s   Denies any new chronic medical conditions  Is routinely following up with cardiology and rheumatology  Has been under lot of stress recently with her 2 jobs and flare-up of her arthritis  Has also been having issues with nausea  At this time is unsure if it is possibly gastroparesis due to her diabetes, were food that she is eating  Symptoms do seem to come and go      Hypoglycemic episodes: No      The patient's last eye exam was in June 2021  Betzaida Merchant patient's last foot exam was in [facility] 2020 in our office      Blood Sugar/Glucometer/Pump/CGM review:  Is currently checking glucose levels 2 to 3 times a day  She will check blood sugars at variable times  Fasting blood sugars are typically around 120 but will be as high as 140  Around lunchtime and dinner she is typically in the low 100s  Lowest blood sugar over the last month was 90 in the highest was 150        She also has history of thyroid nodules with a negative biopsy in the fall of 2017 at [facility],THE has not gone for updated thyroid ultrasound        For her history of hypertension she is treated with lisinopril and metoprolol   Hyperlipidemia she continues on atorvastatin 40 mg daily   Denies any headaches, dizziness, MI or stroke-like symptoms  Review of Systems   Constitutional: Negative for activity change, appetite change, fatigue and unexpected weight change  HENT: Negative for sore throat and trouble swallowing  Eyes: Negative for visual disturbance  Respiratory: Negative for chest tightness and shortness of breath  Cardiovascular: Negative for chest pain, palpitations and leg swelling  Gastrointestinal: Positive for nausea  Negative for abdominal pain, constipation, diarrhea and vomiting  Endocrine: Negative for cold intolerance, heat intolerance, polydipsia, polyphagia and polyuria  Genitourinary: Negative for frequency     Musculoskeletal: Positive for arthralgias and gait problem (Utilizes a cane)    Skin: Negative for wound  Neurological: Negative for dizziness, weakness, numbness and headaches  Psychiatric/Behavioral: Negative for dysphoric mood and sleep disturbance  The patient is not nervous/anxious  Historical Information   No past medical history on file  No past surgical history on file    Social History   Social History     Substance and Sexual Activity   Alcohol Use No     Social History     Substance and Sexual Activity   Drug Use No     Social History     Tobacco Use   Smoking Status Never Smoker   Smokeless Tobacco Never Used     Family History:   Family History   Problem Relation Age of Onset    Stroke Mother     Dementia Mother     Diabetes unspecified Father     Heart disease Father     Arthritis Family     Cancer Family     Diabetes unspecified Family     Heart disease Family        Meds/Allergies   Current Outpatient Medications   Medication Sig Dispense Refill    acetaminophen (TYLENOL) 500 mg tablet Take 500 mg by mouth every 6 (six) hours as needed for mild pain (Patient not taking: Reported on 4/5/2022 )      Alpha-Lipoic Acid 100 MG CAPS Take by mouth      aspirin (ECOTRIN LOW STRENGTH) 81 mg EC tablet Take 81 mg by mouth daily      atorvastatin (LIPITOR) 40 mg tablet Take 40 mg by mouth daily      B-D TB SYRINGE 1CC/26GX3/8" 26G X 3/8" 1 ML MISC INJECT AS DIRECTED  5    Calcium Polycarbophil (FIBER-CAPS PO) Take by mouth (Patient not taking: Reported on 4/5/2022 )      Cholecalciferol (VITAMIN D) 2000 units CAPS Take by mouth      Contour Next Test test strip TEST ONCE DAILY 100 strip 3    Diclofenac Sodium 1 % CREA Place on the skin      Docusate Calcium (STOOL SOFTENER PO) Take by mouth      Dulaglutide (Trulicity) 1 5 TY/0 4VQ SOPN INJECT THE CONTENTS OF 1  PEN SUBCUTANEOUSLY WEEKLY  AS DIRECTED 6 mL 3    DULoxetine (CYMBALTA) 60 mg delayed release capsule Take 60 mg by mouth daily      Flaxseed, Linseed, (FLAX SEED OIL PO) Take by mouth (Patient not taking: Reported on 5/5/1877 )      folic acid (FOLVITE) 1 mg tablet Take 3 mg by mouth daily      furosemide (LASIX) 20 mg tablet Take 20 mg by mouth daily      Andra, Zingiber officinalis, (ANDRA PO) Take by mouth      inFLIXimab (REMICADE) 100 mg Infuse into a venous catheter      KRILL OIL PO Take by mouth      leucovorin (WELLCOVORIN) 5 mg tablet       lisinopril (ZESTRIL) 5 mg tablet Take 5 mg by mouth daily        meloxicam (MOBIC) 15 mg tablet Take 15 mg by mouth daily      metFORMIN (GLUCOPHAGE) 500 mg tablet TAKE 2 TABLETS BY MOUTH IN  THE MORNING AND 2 TABLETS  IN THE EVENING 360 tablet 3    methotrexate 50 MG/2ML injection once a week  1    metoprolol tartrate (LOPRESSOR) 25 mg tablet Take 25 mg by mouth 2 (two) times a day      Microlet Lancets MISC TEST ONCE DAILY 100 each 3    potassium chloride (MICRO-K) 10 MEQ CR capsule Take 20 mEq by mouth 2 (two) times a day        No current facility-administered medications for this visit  Allergies   Allergen Reactions    Digoxin Nausea Only, GI Intolerance and Vomiting     Other reaction(s): stomach upset    Other      Other reaction(s): vomiting    Prochlorperazine      Other reaction(s): stomach upset       Objective   Vitals: There were no vitals taken for this visit  Physical Exam  Vitals and nursing note reviewed  Constitutional:       General: She is not in acute distress  Appearance: Normal appearance  She is not diaphoretic  HENT:      Head: Normocephalic and atraumatic  Eyes:      General: No scleral icterus  Extraocular Movements: Extraocular movements intact  Conjunctiva/sclera: Conjunctivae normal       Pupils: Pupils are equal, round, and reactive to light  Neck:      Thyroid: Thyroid mass present  No thyromegaly or thyroid tenderness  Cardiovascular:      Rate and Rhythm: Normal rate and regular rhythm  Heart sounds: No murmur heard    Pulmonary:      Effort: Pulmonary effort is normal  No respiratory distress  Breath sounds: Normal breath sounds  No wheezing  Musculoskeletal:      Cervical back: Normal range of motion  Right lower leg: No edema  Left lower leg: No edema  Lymphadenopathy:      Cervical: No cervical adenopathy  Neurological:      Mental Status: She is alert and oriented to person, place, and time  Mental status is at baseline  Sensory: No sensory deficit  Gait: Gait abnormal (Utilizes cane)  Psychiatric:         Mood and Affect: Mood normal          Behavior: Behavior normal          Thought Content: Thought content normal          The history was obtained from the review of the chart, patient  Lab Results:   Lab Results   Component Value Date/Time    Hemoglobin A1C 6 6 (H) 08/02/2022 09:26 AM    Hemoglobin A1C 6 3 (H) 03/11/2022 11:25 AM    Hemoglobin A1C 6 5 (H) 11/17/2021 09:00 AM    BUN 17 08/02/2022 09:26 AM    BUN 17 03/11/2022 11:25 AM    Potassium 4 4 08/02/2022 09:26 AM    Potassium 4 6 03/11/2022 11:25 AM    Chloride 99 08/02/2022 09:26 AM    Chloride 99 03/11/2022 11:25 AM    CO2 26 08/02/2022 09:26 AM    CO2 25 03/11/2022 11:25 AM    Creatinine 0 63 08/02/2022 09:26 AM    Creatinine 0 65 03/11/2022 11:25 AM    AST 15 08/02/2022 09:26 AM    AST 12 03/11/2022 11:25 AM    ALT 15 08/02/2022 09:26 AM    ALT 15 03/11/2022 11:25 AM    Albumin 4 5 08/02/2022 09:26 AM    Albumin 4 3 03/11/2022 11:25 AM    Globulin, Total 2 8 08/02/2022 09:26 AM    Globulin, Total 2 7 03/11/2022 11:25 AM    HDL 56 08/02/2022 09:26 AM    Triglycerides 128 08/02/2022 09:26 AM       Portions of the record may have been created with voice recognition software  Occasional wrong word or "sound a like" substitutions may have occurred due to the inherent limitations of voice recognition software  Read the chart carefully and recognize, using context, where substitutions have occurred

## 2022-08-16 NOTE — PATIENT INSTRUCTIONS
Continue to monitor diet, and maintain physical activity  Continue with metformin 1000 mg twice a day, and Trulicity 1 5 mg weekly  Get ultrasound of thyroid completed at earliest convenience  Follow-up in 4 months with lab work completed prior to visit        Can call financial assistance [phone]

## 2022-10-24 ENCOUNTER — TELEPHONE (OUTPATIENT)
Dept: ENDOCRINOLOGY | Facility: HOSPITAL | Age: 62
End: 2022-10-24

## 2022-10-24 NOTE — TELEPHONE ENCOUNTER
Patient left voicemail with was broken up and hard to understand  It appears she is applying for patient assistance for one of her medications and is requesting an email address so the office can complete the provider portion  For these forms we need to fill them out physically  Left message to patient to call back and discuss

## 2022-11-29 LAB
EST. AVERAGE GLUCOSE BLD GHB EST-MCNC: 128 MG/DL
HBA1C MFR BLD: 6.1 % (ref 4.8–5.6)

## 2022-12-14 ENCOUNTER — OFFICE VISIT (OUTPATIENT)
Dept: ENDOCRINOLOGY | Facility: HOSPITAL | Age: 62
End: 2022-12-14

## 2022-12-14 VITALS
HEART RATE: 60 BPM | SYSTOLIC BLOOD PRESSURE: 124 MMHG | DIASTOLIC BLOOD PRESSURE: 78 MMHG | BODY MASS INDEX: 33.43 KG/M2 | HEIGHT: 67 IN | WEIGHT: 213 LBS

## 2022-12-14 DIAGNOSIS — E11.9 TYPE 2 DIABETES MELLITUS WITHOUT COMPLICATION, WITHOUT LONG-TERM CURRENT USE OF INSULIN (HCC): ICD-10-CM

## 2022-12-14 RX ORDER — TRIAMCINOLONE ACETONIDE 1 MG/G
CREAM TOPICAL
COMMUNITY
Start: 2022-10-27

## 2022-12-14 RX ORDER — PERPHENAZINE 16 MG/1
TABLET, FILM COATED ORAL
Qty: 100 STRIP | Refills: 3 | Status: SHIPPED | OUTPATIENT
Start: 2022-12-14

## 2022-12-14 NOTE — PATIENT INSTRUCTIONS
Preventive Health Recommendations  Female Ages 26 - 39  Yearly exam:   See your health care provider every year in order to    Review health changes.     Discuss preventive care.      Review your medicines if you your doctor has prescribed any.    Until age 30: Get a Pap test every three years (more often if you have had an abnormal result).    After age 30: Talk to your doctor about whether you should have a Pap test every 3 years or have a Pap test with HPV screening every 5 years.   You do not need a Pap test if your uterus was removed (hysterectomy) and you have not had cancer.  You should be tested each year for STDs (sexually transmitted diseases), if you're at risk.   Talk to your provider about how often to have your cholesterol checked.  If you are at risk for diabetes, you should have a diabetes test (fasting glucose).  Shots: Get a flu shot each year. Get a tetanus shot every 10 years.   Nutrition:     Eat at least 5 servings of fruits and vegetables each day.    Eat whole-grain bread, whole-wheat pasta and brown rice instead of white grains and rice.    Get adequate Calcium and Vitamin D.     Lifestyle    Exercise at least 150 minutes a week (30 minutes a day, 5 days of the week). This will help you control your weight and prevent disease.    Limit alcohol to one drink per day.    No smoking.     Wear sunscreen to prevent skin cancer.    See your dentist every six months for an exam and cleaning.     Continue to monitor diet, and maintain physical activity  Continue with metformin 1000 mg twice a day, and Trulicity 1 5 mg weekly  Get ultrasound of thyroid completed at earliest convenience  Follow-up in 4 months with lab work completed prior to visit

## 2022-12-14 NOTE — PROGRESS NOTES
Diane Tabor 57 y o  female MRN: [id number]    Encounter: [id number]      Assessment/Plan     Assessment: This is a 57y o -year-old female with type 2 diabetes with hypertension and hyperlipidemia, and thyroid mental     Plan:  1  Type 2 diabetes: Recent hemoglobin A1c was 6 1  Review of glucose logs significant improvement in blood sugars  At this time she will continue with metformin 1000 mg twice a day and Trulicity 1 5 mg weekly  Continue with lifestyle modifications help improve glucose levels  Continue to check blood sugar 2 or more times a day  Contact the office with any concerns or questions  Follow-up in 4 months with lab work completed prior to visit       2  Thyroid nodules:  No recent thyroid ultrasound was completed   Has not been having any neck compressive symptoms   Would recommend getting ultrasound completed at earliest convenience      3  Hypertension:  Normotensive in the office today  Verna Cadena function remained stable with normal electrolytes  Continue with Lasix, metoprolol, lisinopril   Repeat CMP prior to next office visit      4  Hyperlipidemia:  Recent lipid panel looks excellent  Continue with atorvastatin 40 mg daily  Will continue monitor over time  CC: Type II diabetes and thyroid nodule follow-up    History of Present Illness     HPI:  Pratik Robertson U 79 [employer] old female with type 2 diabetes for about 9 years   She is on oral agents at home and takes Metformin 1000 mg in the am and pm, Trulicity 1 5 mg weekly  She denies any polyuria, polydipsia, nocturia and blurry vision   She denies neuropathy, nephropathy and retinopathy   She is continuing to watch her diet is looking in to doing weight watchers again  Physical activity is limited at this time due to her other chronic medical conditions  Has quit her second job, and this has relieved a lot of her stress and believes has overall improved her health  Is trying to continue to lose weight    Has lost 5 pounds since last office visit  Olaf Kramer trying to get her A1c into the 5s   Denies any new chronic medical conditions   Is routinely following up with cardiology and rheumatology  Continues with episodes of nausea and states that it occurs on roughly 6-week basis      Hypoglycemic episodes: No      The patient's last eye exam was in June 2021  States she does have an upcoming appointment  Komal Morrison patient's last foot exam was in [facility] 2020 in our office      Blood Sugar/Glucometer/Pump/CGM review:  Is currently checking glucose levels 2 to 3 times a day  Fasting blood sugars are typically between 110 and 130  Blood sugar levels later in the day are much better and are typically between 80 and 110        She also has history of thyroid nodules with a negative biopsy in the fall of 2017 at [facility] has not gone for updated thyroid ultrasound        For her history of hypertension she is treated with lasix, lisinopril and metoprolol   Hyperlipidemia she continues on atorvastatin 40 mg daily   Denies any headaches, dizziness, MI or stroke-like symptoms  Review of Systems   Constitutional: Negative for activity change, appetite change, fatigue and unexpected weight change  HENT: Negative for sore throat and trouble swallowing  Eyes: Negative for visual disturbance  Respiratory: Negative for chest tightness and shortness of breath  Cardiovascular: Negative for chest pain, palpitations and leg swelling  Gastrointestinal: Positive for nausea  Negative for abdominal pain, constipation, diarrhea and vomiting  Endocrine: Negative for cold intolerance, heat intolerance, polydipsia, polyphagia and polyuria  Genitourinary: Negative for frequency  Musculoskeletal: Positive for arthralgias and gait problem (Utilizes a cane)  Skin: Negative for wound  Neurological: Negative for dizziness, weakness, numbness and headaches  Psychiatric/Behavioral: Negative for dysphoric mood and sleep disturbance   The patient is not nervous/anxious  Historical Information   History reviewed  No pertinent past medical history  History reviewed  No pertinent surgical history  Social History   Social History     Substance and Sexual Activity   Alcohol Use No     Social History     Substance and Sexual Activity   Drug Use No     Social History     Tobacco Use   Smoking Status Never   Smokeless Tobacco Never     Family History:   Family History   Problem Relation Age of Onset   • Stroke Mother    • Dementia Mother    • Diabetes unspecified Father    • Heart disease Father    • Diabetes type II Father         Age 50 discovered   • Hyperlipidemia Father    • Arthritis Family    • Cancer Family    • Diabetes unspecified Family    • Heart disease Family        Meds/Allergies   Current Outpatient Medications   Medication Sig Dispense Refill   • acetaminophen (TYLENOL) 500 mg tablet Take 500 mg by mouth every 6 (six) hours as needed for mild pain     • Alpha-Lipoic Acid 100 MG CAPS Take by mouth     • aspirin (ECOTRIN LOW STRENGTH) 81 mg EC tablet Take 81 mg by mouth daily     • atorvastatin (LIPITOR) 40 mg tablet Take 40 mg by mouth daily     • B-D TB SYRINGE 1CC/26GX3/8" 26G X 3/8" 1 ML MISC INJECT AS DIRECTED  5   • Calcium Polycarbophil (FIBER-CAPS PO) Take by mouth     • Cholecalciferol (VITAMIN D) 2000 units CAPS Take by mouth     • Contour Next Test test strip Use to check glucose levels every day   100 strip 3   • Diclofenac Sodium 1 % CREA Place on the skin     • Docusate Calcium (STOOL SOFTENER PO) Take by mouth     • Dulaglutide (Trulicity) 1 5 MU/1 8KL SOPN INJECT THE CONTENTS OF 1  PEN SUBCUTANEOUSLY WEEKLY  AS DIRECTED 6 mL 3   • DULoxetine (CYMBALTA) 60 mg delayed release capsule Take 60 mg by mouth daily     • folic acid (FOLVITE) 1 mg tablet Take 3 mg by mouth daily     • furosemide (LASIX) 20 mg tablet Take 20 mg by mouth 3 (three) times a week     • Ginger, Zingiber officinalis, (GINGER PO) Take by mouth     • inFLIXimab (REMICADE) 100 mg Infuse into a venous catheter     • KRILL OIL PO Take by mouth     • leucovorin (WELLCOVORIN) 5 mg tablet      • lisinopril (ZESTRIL) 5 mg tablet Take 5 mg by mouth daily       • meloxicam (MOBIC) 15 mg tablet Take 7 5 mg by mouth daily     • metFORMIN (GLUCOPHAGE) 500 mg tablet TAKE 2 TABLETS BY MOUTH IN  THE MORNING AND 2 TABLETS  IN THE EVENING 360 tablet 3   • methotrexate 50 MG/2ML injection once a week  1   • metoprolol tartrate (LOPRESSOR) 25 mg tablet Take 25 mg by mouth 2 (two) times a day     • Microlet Lancets MISC TEST ONCE DAILY 100 each 3   • potassium chloride (MICRO-K) 10 MEQ CR capsule Take 20 mEq by mouth 2 (two) times a day      • triamcinolone (KENALOG) 0 1 % cream        No current facility-administered medications for this visit  Allergies   Allergen Reactions   • Digoxin Nausea Only, GI Intolerance and Vomiting     Other reaction(s): stomach upset   • Other      Other reaction(s): vomiting   • Prochlorperazine      Other reaction(s): stomach upset       Objective   Vitals: Blood pressure 124/78, pulse 60, height 5' 7 25" (1 708 m), weight 96 6 kg (213 lb)  Physical Exam  Vitals and nursing note reviewed  Constitutional:       General: She is not in acute distress  Appearance: Normal appearance  She is not diaphoretic  HENT:      Head: Normocephalic and atraumatic  Eyes:      General: No scleral icterus  Extraocular Movements: Extraocular movements intact  Conjunctiva/sclera: Conjunctivae normal       Pupils: Pupils are equal, round, and reactive to light  Cardiovascular:      Rate and Rhythm: Normal rate and regular rhythm  Heart sounds: No murmur heard  Pulmonary:      Effort: Pulmonary effort is normal  No respiratory distress  Breath sounds: Normal breath sounds  No wheezing  Musculoskeletal:      Cervical back: Normal range of motion  Right lower leg: No edema  Left lower leg: No edema     Lymphadenopathy: Cervical: No cervical adenopathy  Neurological:      Mental Status: She is alert and oriented to person, place, and time  Mental status is at baseline  Sensory: No sensory deficit  Gait: Gait abnormal (utilizes cane)  Psychiatric:         Mood and Affect: Mood normal          Behavior: Behavior normal          Thought Content: Thought content normal          The history was obtained from the review of the chart, patient  Lab Results:   Lab Results   Component Value Date/Time    Hemoglobin A1C 6 1 (H) 11/28/2022 09:53 AM    Hemoglobin A1C 6 6 (H) 08/02/2022 09:26 AM    Hemoglobin A1C 6 3 (H) 03/11/2022 11:25 AM    BUN 17 08/02/2022 09:26 AM    BUN 17 03/11/2022 11:25 AM    Potassium 4 4 08/02/2022 09:26 AM    Potassium 4 6 03/11/2022 11:25 AM    Chloride 99 08/02/2022 09:26 AM    Chloride 99 03/11/2022 11:25 AM    CO2 26 08/02/2022 09:26 AM    CO2 25 03/11/2022 11:25 AM    Creatinine 0 63 08/02/2022 09:26 AM    Creatinine 0 65 03/11/2022 11:25 AM    AST 15 08/02/2022 09:26 AM    AST 12 03/11/2022 11:25 AM    ALT 15 08/02/2022 09:26 AM    ALT 15 03/11/2022 11:25 AM    Albumin 4 5 08/02/2022 09:26 AM    Albumin 4 3 03/11/2022 11:25 AM    Globulin, Total 2 8 08/02/2022 09:26 AM    Globulin, Total 2 7 03/11/2022 11:25 AM    HDL 56 08/02/2022 09:26 AM    Triglycerides 128 08/02/2022 09:26 AM       Portions of the record may have been created with voice recognition software  Occasional wrong word or "sound a like" substitutions may have occurred due to the inherent limitations of voice recognition software  Read the chart carefully and recognize, using context, where substitutions have occurred

## 2022-12-31 DIAGNOSIS — E11.9 TYPE 2 DIABETES MELLITUS WITHOUT COMPLICATION, WITHOUT LONG-TERM CURRENT USE OF INSULIN (HCC): ICD-10-CM

## 2023-01-04 ENCOUNTER — DOCUMENTATION (OUTPATIENT)
Dept: NEPHROLOGY | Facility: HOSPITAL | Age: 63
End: 2023-01-04

## 2023-01-14 DIAGNOSIS — E11.9 TYPE 2 DIABETES MELLITUS WITHOUT COMPLICATION, WITHOUT LONG-TERM CURRENT USE OF INSULIN (HCC): ICD-10-CM

## 2023-01-16 RX ORDER — DULAGLUTIDE 1.5 MG/.5ML
INJECTION, SOLUTION SUBCUTANEOUS
Qty: 6 ML | Refills: 3 | Status: SHIPPED | OUTPATIENT
Start: 2023-01-16

## 2023-03-29 LAB
ALBUMIN SERPL-MCNC: 4.5 G/DL (ref 3.8–4.8)
ALBUMIN/GLOB SERPL: 1.5 {RATIO} (ref 1.2–2.2)
ALP SERPL-CCNC: 69 IU/L (ref 44–121)
ALT SERPL-CCNC: 17 IU/L (ref 0–32)
AST SERPL-CCNC: 15 IU/L (ref 0–40)
BILIRUB SERPL-MCNC: 0.9 MG/DL (ref 0–1.2)
BUN SERPL-MCNC: 19 MG/DL (ref 8–27)
BUN/CREAT SERPL: 29 (ref 12–28)
CALCIUM SERPL-MCNC: 9.7 MG/DL (ref 8.7–10.3)
CHLORIDE SERPL-SCNC: 102 MMOL/L (ref 96–106)
CO2 SERPL-SCNC: 23 MMOL/L (ref 20–29)
CREAT SERPL-MCNC: 0.66 MG/DL (ref 0.57–1)
EGFR: 97 ML/MIN/1.73
EST. AVERAGE GLUCOSE BLD GHB EST-MCNC: 137 MG/DL
GLOBULIN SER-MCNC: 3 G/DL (ref 1.5–4.5)
GLUCOSE SERPL-MCNC: 144 MG/DL (ref 70–99)
HBA1C MFR BLD: 6.4 % (ref 4.8–5.6)
POTASSIUM SERPL-SCNC: 4.8 MMOL/L (ref 3.5–5.2)
PROT SERPL-MCNC: 7.5 G/DL (ref 6–8.5)
SODIUM SERPL-SCNC: 141 MMOL/L (ref 134–144)

## 2023-04-13 ENCOUNTER — TELEPHONE (OUTPATIENT)
Dept: ADMINISTRATIVE | Facility: OTHER | Age: 63
End: 2023-04-13

## 2023-04-13 NOTE — LETTER
Diabetic Eye Exam Form    Date Requested: 23  Patient: Dell Welch  Patient : 1960   Referring Provider: Del Orozco MD      DIABETIC Eye Exam Date _______________________________      Type of Exam MUST be documented for Diabetic Eye Exams  Please CHECK ONE  Retinal Exam       Dilated Retinal Exam       OCT       Optomap-Iris Exam      Fundus Photography       Left Eye - Please check Retinopathy or No Retinopathy        Exam did show retinopathy    Exam did not show retinopathy       Right Eye - Please check Retinopathy or No Retinopathy       Exam did show retinopathy    Exam did not show retinopathy       Comments __________________________________________________________    Practice Providing Exam ______________________________________________    Exam Performed By (print name) _______________________________________      Provider Signature ___________________________________________________      These reports are needed for  compliance  Please fax this completed form and a copy of the Diabetic Eye Exam report to our office located at [place] [address] as soon as possible via Fax [phone] yariel Chapman: Phone [phone]  We thank you for your assistance in treating our mutual patient     [employer] [phone] F [phone]

## 2023-04-13 NOTE — LETTER
Diabetic Eye Exam Form    Date Requested: 23  Patient: Allison Fragoso  Patient : 1960   Referring Provider: Jaimee Gardner MD      DIABETIC Eye Exam Date _______________________________      Type of Exam MUST be documented for Diabetic Eye Exams  Please CHECK ONE  Retinal Exam       Dilated Retinal Exam       OCT       Optomap-Iris Exam      Fundus Photography       Left Eye - Please check Retinopathy or No Retinopathy        Exam did show retinopathy    Exam did not show retinopathy       Right Eye - Please check Retinopathy or No Retinopathy       Exam did show retinopathy    Exam did not show retinopathy       Comments __________________________________________________________    Practice Providing Exam ______________________________________________    Exam Performed By (print name) _______________________________________      Provider Signature ___________________________________________________      These reports are needed for  compliance  Please fax this completed form and a copy of the Diabetic Eye Exam report to our office located at [place] [address] as soon as possible via Fax [phone] yariel Wall: Phone [phone]  We thank you for your assistance in treating our mutual patient     [employer] [phone] F [phone]

## 2023-04-13 NOTE — TELEPHONE ENCOUNTER
----- Message from Erica Pina, [address] sent at 4/12/2023  1:30 PM EDT -----  Regarding: diabetic eye exam  04/12/23 1:31 PM    Hello, our patient Lorena Watson has had diabetic eye exam completed/performed  Please assist in updating the patient chart by [facility]  The date of service is 2023      Thank you,  Erica Pina MA  [facility] [place]

## 2023-04-18 NOTE — TELEPHONE ENCOUNTER
Upon review of the In Basket request and the patient's chart, initial outreach has been made via fax to facility  Please see Contacts section for details       Thank you  Jamie Landry

## 2023-04-25 NOTE — TELEPHONE ENCOUNTER
As a follow-up, a second attempt has been made for outreach via fax to facility  Please see Contacts section for details      Thank you  Sandy Overall

## 2023-04-26 LAB
LEFT EYE DIABETIC RETINOPATHY: POSITIVE
RIGHT EYE DIABETIC RETINOPATHY: POSITIVE

## 2023-04-27 NOTE — TELEPHONE ENCOUNTER
Upon review of the In Basket request we were able to locate, review, and update the patient chart as requested for Diabetic Eye Exam     Any additional questions or concerns should be emailed to the Practice Liaisons via the appropriate education email address, please do not reply via In Basket      Thank you  aJmie Landry

## 2023-08-17 LAB
ALBUMIN SERPL-MCNC: 4.5 G/DL (ref 3.9–4.9)
ALBUMIN/CREAT UR: 3 MG/G CREAT (ref 0–29)
ALBUMIN/GLOB SERPL: 1.6 {RATIO} (ref 1.2–2.2)
ALP SERPL-CCNC: 66 IU/L (ref 44–121)
ALT SERPL-CCNC: 17 IU/L (ref 0–32)
AST SERPL-CCNC: 17 IU/L (ref 0–40)
BILIRUB SERPL-MCNC: 1.1 MG/DL (ref 0–1.2)
BUN SERPL-MCNC: 18 MG/DL (ref 8–27)
BUN/CREAT SERPL: 25 (ref 12–28)
CALCIUM SERPL-MCNC: 10.2 MG/DL (ref 8.7–10.3)
CHLORIDE SERPL-SCNC: 97 MMOL/L (ref 96–106)
CHOLEST SERPL-MCNC: 154 MG/DL (ref 100–199)
CO2 SERPL-SCNC: 24 MMOL/L (ref 20–29)
CREAT SERPL-MCNC: 0.73 MG/DL (ref 0.57–1)
CREAT UR-MCNC: 99.3 MG/DL
EGFR: 91 ML/MIN/1.73
GLOBULIN SER-MCNC: 2.9 G/DL (ref 1.5–4.5)
GLUCOSE SERPL-MCNC: 141 MG/DL (ref 70–99)
HBA1C MFR BLD: 6.2 % (ref 4.8–5.6)
HDLC SERPL-MCNC: 70 MG/DL
LDLC SERPL CALC-MCNC: 58 MG/DL (ref 0–99)
MICROALBUMIN UR-MCNC: 3.4 UG/ML
POTASSIUM SERPL-SCNC: 4.8 MMOL/L (ref 3.5–5.2)
PROT SERPL-MCNC: 7.4 G/DL (ref 6–8.5)
SL AMB VLDL CHOLESTEROL CALC: 26 MG/DL (ref 5–40)
SODIUM SERPL-SCNC: 136 MMOL/L (ref 134–144)
TRIGL SERPL-MCNC: 158 MG/DL (ref 0–149)
TSH SERPL DL<=0.005 MIU/L-ACNC: 1.72 UIU/ML (ref 0.45–4.5)

## 2023-08-25 ENCOUNTER — OFFICE VISIT (OUTPATIENT)
Dept: ENDOCRINOLOGY | Facility: HOSPITAL | Age: 63
End: 2023-08-25
Payer: MEDICARE

## 2023-08-25 VITALS
WEIGHT: 210 LBS | DIASTOLIC BLOOD PRESSURE: 80 MMHG | BODY MASS INDEX: 32.65 KG/M2 | HEART RATE: 62 BPM | OXYGEN SATURATION: 96 % | SYSTOLIC BLOOD PRESSURE: 128 MMHG

## 2023-08-25 DIAGNOSIS — E04.2 MULTIPLE THYROID NODULES: ICD-10-CM

## 2023-08-25 DIAGNOSIS — E11.9 TYPE 2 DIABETES MELLITUS WITHOUT COMPLICATION, WITHOUT LONG-TERM CURRENT USE OF INSULIN (HCC): Primary | ICD-10-CM

## 2023-08-25 DIAGNOSIS — I10 ESSENTIAL HYPERTENSION: ICD-10-CM

## 2023-08-25 PROCEDURE — 99214 OFFICE O/P EST MOD 30 MIN: CPT | Performed by: PHYSICIAN ASSISTANT

## 2023-08-25 NOTE — PROGRESS NOTES
Nadiya Lim 70 y.o. female MRN: [id number]    Encounter: [id number]      Assessment/Plan     Assessment: This is a 70y.o.-year-old female with type 2 diabetes with hypertension and hyperlipidemia, and thyroid nodules. Plan:  1. Type 2 diabetes: Recent hemoglobin A1c was 6.2. Reviewed blood sugar logs and everything seems to be stable since last office visit. She will continue with metformin 1000 mg twice a day and Trulicity 1.5 mg weekly. Continue checking glucose levels 2 or more times a day. Contact the office with any concerns or questions. Follow-up in 4 months with lab work completed prior to visit.     2. Thyroid nodules: No neck compressive symptoms at this time. No need for routine follow-up of ultrasound. If symptoms change, please contact the office and we will order ultrasound.     3. Hypertension:  Normotensive in the office today. [employer] function remained stable with normal electrolytes.  Continue with Lasix, metoprolol, lisinopril.  Repeat CMP prior to next office visit.     4. Hyperlipidemia:  Recent lipid panel looks excellent.  Continue with atorvastatin 40 mg daily. Completed prior to the next office visit    CC: Type 2 diabetes and thyroid nodule follow-up    History of Present Illness     HPI:  Stephanie Umana M 83 ANZI old female with type 2 diabetes for about 10 years.  She is on oral agents at home and takes Metformin 1000 mg in the am and pm, Trulicity 1.5 mg weekly. She denies any polyuria, polydipsia, nocturia and blurry vision.  She denies neuropathy, nephropathy and retinopathy.  She is continuing to watch her diet and has been using the [employer] juana, which has helped with weight loss.  Physical activity is limited at this time due to her other chronic medical conditions. Is trying to find ways of increasing physical activity.  May be laid off of her job sooner than she though, so is stress about her finances.  Is looking for another job. Jacobo List any new chronic medical conditions.  Is routinely following up with cardiology and rheumatology. May start on reclast for osteoporosis.     Hypoglycemic episodes: No.     The patient's last eye exam was in April 2023.  The patient's last foot exam was in December 2020 in our office.     Blood Sugar/Glucometer/Pump/CGM review: Currently checking blood sugars 1-2 times a day at alternating times. Sting blood sugars are typically in the 120s, but no higher than 140. Later in the day she can range anywhere between 90 and 130.     She also has history of thyroid nodules with a negative biopsy in the fall of 2017 at [facility],THE has not gone for updated thyroid ultrasound.       For her history of hypertension she is treated with lasix, lisinopril and metoprolol.  Hyperlipidemia she continues on atorvastatin 40 mg daily.  Denies any headaches, dizziness, MI or stroke-like symptoms. Review of Systems   Constitutional: Negative for activity change, appetite change, fatigue and unexpected weight change. HENT: Negative for sore throat and trouble swallowing. Eyes: Negative for visual disturbance. Respiratory: Negative for chest tightness and shortness of breath. Cardiovascular: Negative for chest pain, palpitations and leg swelling. Gastrointestinal: Positive for nausea. Negative for abdominal pain, constipation, diarrhea and vomiting. Endocrine: Negative for cold intolerance, heat intolerance, polydipsia, polyphagia and polyuria. Genitourinary: Negative for frequency. Musculoskeletal: Positive for arthralgias and gait problem (Utilizes a cane). Skin: Negative for wound. Neurological: Negative for dizziness, weakness, numbness and headaches. Psychiatric/Behavioral: Negative for dysphoric mood and sleep disturbance. The patient is not nervous/anxious.         Historical Information   Past Medical History:   Diagnosis Date   • Neuropathy      Past Surgical History:   Procedure Laterality Date   • ANKLE SURGERY Left 2002   • KNEE SURGERY Bilateral 2002   • SINUS SURGERY  1995     Social History   Social History     Substance and Sexual Activity   Alcohol Use No     Social History     Substance and Sexual Activity   Drug Use No     Social History     Tobacco Use   Smoking Status Never   Smokeless Tobacco Never     Family History:   Family History   Problem Relation Age of Onset   • Stroke Mother    • Dementia Mother    • Diabetes unspecified Father    • Heart disease Father    • Diabetes type II Father         Age 50 discovered   • Hyperlipidemia Father    • Arthritis Family    • Cancer Family    • Diabetes unspecified Family    • Heart disease Family        Meds/Allergies   Current Outpatient Medications   Medication Sig Dispense Refill   • acetaminophen (TYLENOL) 500 mg tablet Take 500 mg by mouth every 6 (six) hours as needed for mild pain     • Alpha-Lipoic Acid 100 MG CAPS Take by mouth     • aspirin (ECOTRIN LOW STRENGTH) 81 mg EC tablet Take 81 mg by mouth daily     • atorvastatin (LIPITOR) 40 mg tablet Take 40 mg by mouth daily     • B-D TB SYRINGE 1CC/26GX3/8" 26G X 3/8" 1 ML MISC INJECT AS DIRECTED  5   • Calcium Polycarbophil (FIBER-CAPS PO) Take by mouth     • Cholecalciferol (VITAMIN D) 2000 units CAPS Take by mouth     • Contour Next Test test strip Use to check glucose levels every day.  100 strip 3   • Diclofenac Sodium 1 % CREA Place on the skin     • Docusate Calcium (STOOL SOFTENER PO) Take by mouth     • DULoxetine (CYMBALTA) 60 mg delayed release capsule Take 60 mg by mouth daily     • folic acid (FOLVITE) 1 mg tablet Take 3 mg by mouth daily     • furosemide (LASIX) 20 mg tablet Take 20 mg by mouth 3 (three) times a week     • Ginger, Zingiber officinalis, (GINGER PO) Take by mouth     • inFLIXimab (REMICADE) 100 mg Infuse into a venous catheter     • KRILL OIL PO Take by mouth     • leucovorin (WELLCOVORIN) 5 mg tablet      • lisinopril (ZESTRIL) 5 mg tablet Take 5 mg by mouth daily       • meloxicam (MOBIC) 15 mg tablet Take 7.5 mg by mouth daily     • metFORMIN (GLUCOPHAGE) 500 mg tablet TAKE 2 TABLETS BY MOUTH IN  THE MORNING AND 2 TABLETS  BY MOUTH IN THE EVENING 360 tablet 3   • methotrexate 50 MG/2ML injection once a week  1   • metoprolol tartrate (LOPRESSOR) 25 mg tablet Take 25 mg by mouth 2 (two) times a day     • Microlet Lancets MISC TEST ONCE DAILY 100 each 3   • potassium chloride (MICRO-K) 10 MEQ CR capsule Take 20 mEq by mouth 2 (two) times a day      • triamcinolone (KENALOG) 0.1 % cream      • Trulicity 1.5 TG/7.4QH injection INJECT THE CONTENTS OF 1  PEN SUBCUTANEOUSLY WEEKLY  AS DIRECTED 6 mL 3     No current facility-administered medications for this visit. Allergies   Allergen Reactions   • Digoxin Nausea Only, GI Intolerance and Vomiting     Other reaction(s): stomach upset   • Other      Other reaction(s): vomiting   • Prochlorperazine      Other reaction(s): stomach upset       Objective   Vitals: Blood pressure 128/80, pulse 62, weight 95.3 kg (210 lb), SpO2 96 %. Physical Exam  Vitals and nursing note reviewed. Constitutional:       General: She is not in acute distress. Appearance: Normal appearance. She is not diaphoretic. HENT:      Head: Normocephalic and atraumatic. Eyes:      General: No scleral icterus. Extraocular Movements: Extraocular movements intact. Conjunctiva/sclera: Conjunctivae normal.      Pupils: Pupils are equal, round, and reactive to light. Neck:      Thyroid: No thyroid mass, thyromegaly or thyroid tenderness. Cardiovascular:      Rate and Rhythm: Normal rate and regular rhythm. Heart sounds: No murmur heard. Pulmonary:      Effort: Pulmonary effort is normal. No respiratory distress. Breath sounds: Normal breath sounds. No wheezing. Musculoskeletal:      Cervical back: Normal range of motion. Right lower leg: No edema. Left lower leg: No edema. Lymphadenopathy:      Cervical: No cervical adenopathy. Neurological:      Mental Status: She is alert and oriented to person, place, and time. Mental status is at baseline. Sensory: No sensory deficit. Motor: No tremor. Gait: Gait abnormal (Utilizes a cane). Deep Tendon Reflexes:      Reflex Scores:       Patellar reflexes are 2+ on the right side and 2+ on the left side. Psychiatric:         Mood and Affect: Mood normal.         Behavior: Behavior normal.         Thought Content: Thought content normal.         The history was obtained from the review of the chart, patient. Lab Results:   Lab Results   Component Value Date/Time    Hemoglobin A1C 6.2 (H) 08/16/2023 09:02 AM    Hemoglobin A1C 6.4 (H) 03/28/2023 09:34 AM    Hemoglobin A1C 6.1 (H) 11/28/2022 09:53 AM    BUN 18 08/16/2023 09:02 AM    BUN 19 03/28/2023 09:34 AM    Potassium 4.8 08/16/2023 09:02 AM    Potassium 4.8 03/28/2023 09:34 AM    Chloride 97 08/16/2023 09:02 AM    Chloride 102 03/28/2023 09:34 AM    CO2 24 08/16/2023 09:02 AM    CO2 23 03/28/2023 09:34 AM    Creatinine 0.73 08/16/2023 09:02 AM    Creatinine 0.66 03/28/2023 09:34 AM    AST 17 08/16/2023 09:02 AM    AST 15 03/28/2023 09:34 AM    ALT 17 08/16/2023 09:02 AM    ALT 17 03/28/2023 09:34 AM    Protein, Total 7.4 08/16/2023 09:02 AM    Protein, Total 7.5 03/28/2023 09:34 AM    Albumin 4.5 08/16/2023 09:02 AM    Albumin 4.5 03/28/2023 09:34 AM    Globulin, Total 2.9 08/16/2023 09:02 AM    Globulin, Total 3.0 03/28/2023 09:34 AM    HDL 70 08/16/2023 09:02 AM    Triglycerides 158 (H) 08/16/2023 09:02 AM         Portions of the record may have been created with voice recognition software. Occasional wrong word or "sound a like" substitutions may have occurred due to the inherent limitations of voice recognition software. Read the chart carefully and recognize, using context, where substitutions have occurred.

## 2023-08-25 NOTE — PATIENT INSTRUCTIONS
Continue to monitor diet, and maintain physical activity. Continue with metformin 1000 mg twice a day, and Trulicity 1.5 mg weekly. Follow-up in 4 months with lab work completed prior to visit.

## 2023-10-30 ENCOUNTER — DOCUMENTATION (OUTPATIENT)
Dept: ENDOCRINOLOGY | Facility: HOSPITAL | Age: 63
End: 2023-10-30

## 2023-12-20 LAB
ALBUMIN SERPL-MCNC: 4.2 G/DL (ref 3.9–4.9)
ALBUMIN/GLOB SERPL: 1.5 {RATIO} (ref 1.2–2.2)
ALP SERPL-CCNC: 57 IU/L (ref 44–121)
ALT SERPL-CCNC: 16 IU/L (ref 0–32)
AST SERPL-CCNC: 15 IU/L (ref 0–40)
BILIRUB SERPL-MCNC: 0.7 MG/DL (ref 0–1.2)
BUN SERPL-MCNC: 19 MG/DL (ref 8–27)
BUN/CREAT SERPL: 27 (ref 12–28)
CALCIUM SERPL-MCNC: 9 MG/DL (ref 8.7–10.3)
CHLORIDE SERPL-SCNC: 100 MMOL/L (ref 96–106)
CO2 SERPL-SCNC: 24 MMOL/L (ref 20–29)
CREAT SERPL-MCNC: 0.7 MG/DL (ref 0.57–1)
EGFR: 96 ML/MIN/1.73
GLOBULIN SER-MCNC: 2.8 G/DL (ref 1.5–4.5)
GLUCOSE SERPL-MCNC: 130 MG/DL (ref 70–99)
HBA1C MFR BLD: 6.5 % (ref 4.8–5.6)
POTASSIUM SERPL-SCNC: 4.3 MMOL/L (ref 3.5–5.2)
PROT SERPL-MCNC: 7 G/DL (ref 6–8.5)
SODIUM SERPL-SCNC: 138 MMOL/L (ref 134–144)

## 2023-12-27 ENCOUNTER — OFFICE VISIT (OUTPATIENT)
Dept: ENDOCRINOLOGY | Facility: HOSPITAL | Age: 63
End: 2023-12-27
Payer: MEDICARE

## 2023-12-27 ENCOUNTER — DOCUMENTATION (OUTPATIENT)
Dept: ENDOCRINOLOGY | Facility: HOSPITAL | Age: 63
End: 2023-12-27

## 2023-12-27 VITALS
SYSTOLIC BLOOD PRESSURE: 120 MMHG | OXYGEN SATURATION: 99 % | HEART RATE: 66 BPM | BODY MASS INDEX: 33.59 KG/M2 | WEIGHT: 214 LBS | DIASTOLIC BLOOD PRESSURE: 72 MMHG | HEIGHT: 67 IN

## 2023-12-27 DIAGNOSIS — E11.9 TYPE 2 DIABETES MELLITUS WITHOUT COMPLICATION, WITHOUT LONG-TERM CURRENT USE OF INSULIN (HCC): Primary | ICD-10-CM

## 2023-12-27 PROCEDURE — 99214 OFFICE O/P EST MOD 30 MIN: CPT | Performed by: PHYSICIAN ASSISTANT

## 2023-12-27 RX ORDER — BENZONATATE 100 MG/1
100 CAPSULE ORAL 3 TIMES DAILY PRN
COMMUNITY

## 2024-01-30 DIAGNOSIS — E11.9 TYPE 2 DIABETES MELLITUS WITHOUT COMPLICATION, WITHOUT LONG-TERM CURRENT USE OF INSULIN (HCC): ICD-10-CM

## 2024-04-20 LAB
ALBUMIN SERPL-MCNC: 4.5 G/DL (ref 3.9–4.9)
ALBUMIN/GLOB SERPL: 1.4 {RATIO} (ref 1.2–2.2)
ALP SERPL-CCNC: 54 IU/L (ref 44–121)
ALT SERPL-CCNC: 17 IU/L (ref 0–32)
AST SERPL-CCNC: 24 IU/L (ref 0–40)
BILIRUB SERPL-MCNC: 1.2 MG/DL (ref 0–1.2)
BUN SERPL-MCNC: 17 MG/DL (ref 8–27)
BUN/CREAT SERPL: 22 (ref 12–28)
CALCIUM SERPL-MCNC: 9.7 MG/DL (ref 8.7–10.3)
CHLORIDE SERPL-SCNC: 97 MMOL/L (ref 96–106)
CO2 SERPL-SCNC: 19 MMOL/L (ref 20–29)
CREAT SERPL-MCNC: 0.77 MG/DL (ref 0.57–1)
EGFR: 85 ML/MIN/1.73
GLOBULIN SER-MCNC: 3.2 G/DL (ref 1.5–4.5)
GLUCOSE SERPL-MCNC: 144 MG/DL (ref 70–99)
HBA1C MFR BLD: 6.9 % (ref 4.8–5.6)
POTASSIUM SERPL-SCNC: 4.6 MMOL/L (ref 3.5–5.2)
PROT SERPL-MCNC: 7.7 G/DL (ref 6–8.5)
SODIUM SERPL-SCNC: 137 MMOL/L (ref 134–144)

## 2024-05-02 ENCOUNTER — OFFICE VISIT (OUTPATIENT)
Dept: ENDOCRINOLOGY | Facility: HOSPITAL | Age: 64
End: 2024-05-02
Payer: MEDICARE

## 2024-05-02 VITALS
DIASTOLIC BLOOD PRESSURE: 80 MMHG | HEIGHT: 67 IN | HEART RATE: 68 BPM | WEIGHT: 216.8 LBS | BODY MASS INDEX: 34.03 KG/M2 | SYSTOLIC BLOOD PRESSURE: 122 MMHG

## 2024-05-02 DIAGNOSIS — E11.9 TYPE 2 DIABETES MELLITUS WITHOUT COMPLICATION, WITHOUT LONG-TERM CURRENT USE OF INSULIN (HCC): Primary | ICD-10-CM

## 2024-05-02 PROCEDURE — 99214 OFFICE O/P EST MOD 30 MIN: CPT | Performed by: PHYSICIAN ASSISTANT

## 2024-05-02 NOTE — PROGRESS NOTES
Lillie Carmichael 64 y.o. female MRN: [id number]    Encounter: [id number]      Assessment/Plan     Assessment:  This is a 64 y.o.-year-old female with type 2 diabetes with hypertension and hyperlipidemia, and thyroid nodules.    Plan:  1. Type 2 diabetes: Recent hemoglobin A1c was 6.9.  We did discuss possible causes of the increase in her A1c, and lifestyle modifications that could be made to help with overall glucose levels.  We did discuss about additional medications that we could add to help improve glucose levels if needed.  Finances a large concern for her, so we will likely start a sulfonylurea.  She will continue with metformin 1000 mg twice a day and Trulicity 1.5 mg weekly.  Continue checking glucose levels 2 or more times a day.  Contact the office with any concerns or questions.  Follow-up in 4 months with lab work completed prior to visit.     2. Thyroid nodules: No neck compressive symptoms at this time.  No need for routine follow-up of ultrasound.  If symptoms change, please contact the office and we will order ultrasound.     3. Hypertension:  Normotensive in the office today.  Kidney function remained stable with normal electrolytes.  Continue with Lasix, metoprolol, lisinopril.  Repeat CMP prior to next office visit.     4. Hyperlipidemia:  Recent lipid panel looks excellent.  Continue with atorvastatin 40 mg daily.  Completed prior to the next office visit    CC: Type 2 diabetes and thyroid nodule follow-up    History of Present Illness     HPI:  Lillie Carmicahel is a 63 year old female with type 2 diabetes for about 10 years.  She is on oral agents at home and takes Metformin 1000 mg in the am and pm, Trulicity 1.5 mg weekly. She denies any polyuria, polydipsia, nocturia and blurry vision.  She denies neuropathy, nephropathy and retinopathy.  She is continuing to watch her diet and has been using the [employer] juana, which has helped with weight loss.  For a while she did increase her physical activity,  but the past month with her upper respiratory infection has once again decreased.  Still has her job at this time, and also found a new [employer] to work out.  Denies any new chronic medical conditions.  Is routinely following up with cardiology and rheumatology. Started reclast in September 2023 for osteoporosis.  Doing well today.  Still recovering from upper respiratory infection prior to last office visit.  Has been trying to increase her physical activity, and is mainly trying to get at least 5000 steps in a day.  He is under a lot of stress at this point as she has a go to [employer] for an incident that occurred at her [employer].     Hypoglycemic episodes: No.     The patient's last eye exam was in April 2023.  The patient's last foot exam was in December 2020 in our office.     Blood Sugar/Glucometer/Pump/CGM review: Currently checking glucose levels 1-2 times a day.  Fasting blood sugars are typically her highest and are around 130 or 140.  Rest today glucose lower level somewhere between 100 and 130.     She also has history of thyroid nodules with a negative biopsy in the fall of 2017 at [place].  She has not gone for updated thyroid ultrasound.     For her history of hypertension she is treated with lasix, lisinopril and metoprolol.  Hyperlipidemia she continues on atorvastatin 40 mg daily.  Denies any headaches, dizziness, MI or stroke-like symptoms.     Review of Systems   Constitutional:  Negative for activity change, appetite change, fatigue and unexpected weight change.   HENT:  Negative for sore throat and trouble swallowing.    Eyes:  Negative for visual disturbance.   Respiratory:  Negative for chest tightness and shortness of breath.    Cardiovascular:  Negative for chest pain, palpitations and leg swelling.   Gastrointestinal:  Negative for abdominal pain, constipation, diarrhea, nausea and vomiting.   Endocrine: Negative for cold intolerance, heat intolerance, polydipsia, polyphagia and polyuria.    Genitourinary:  Negative for frequency.   Musculoskeletal:  Positive for arthralgias and gait problem (Utilizes a cane).   Skin:  Negative for wound.   Neurological:  Negative for dizziness, weakness, numbness and headaches.   Psychiatric/Behavioral:  Negative for dysphoric mood and sleep disturbance. The patient is not nervous/anxious.        Historical Information   Past Medical History:   Diagnosis Date   • Neuropathy      Past Surgical History:   Procedure Laterality Date   • ANKLE SURGERY Left 2002   • KNEE SURGERY Bilateral 2002   • SINUS SURGERY  1995     Social History   Social History     Substance and Sexual Activity   Alcohol Use No     Social History     Substance and Sexual Activity   Drug Use No     Social History     Tobacco Use   Smoking Status Never   Smokeless Tobacco Never     Family History:   Family History   Problem Relation Age of Onset   • Stroke Mother    • Dementia Mother    • Diabetes unspecified Father    • Heart disease Father    • Diabetes type II Father         Age 48 discovered   • Hyperlipidemia Father    • Arthritis Family    • Cancer Family    • Diabetes unspecified Family    • Heart disease Family        Meds/Allergies   Current Outpatient Medications   Medication Sig Dispense Refill   • acetaminophen (TYLENOL) 500 mg tablet Take 500 mg by mouth every 6 (six) hours as needed for mild pain     • Albuterol Sulfate 108 (90 Base) MCG/ACT AEPB Inhale 2 puffs     • Alpha-Lipoic Acid 100 MG CAPS Take by mouth     • aspirin (ECOTRIN LOW STRENGTH) 81 mg EC tablet Take 81 mg by mouth daily     • atorvastatin (LIPITOR) 40 mg tablet Take 40 mg by mouth daily     • benzonatate (TESSALON PERLES) 100 mg capsule Take 100 mg by mouth 3 (three) times a day as needed for cough     • Cholecalciferol (VITAMIN D) 2000 units CAPS Take by mouth     • Contour Next Test test strip Use to check glucose levels every day. 100 strip 3   • Diclofenac Sodium 1 % CREA Place on the skin     • Docusate Calcium  "(STOOL SOFTENER PO) Take by mouth     • DULoxetine (CYMBALTA) 60 mg delayed release capsule Take 60 mg by mouth daily     • furosemide (LASIX) 20 mg tablet Take 20 mg by mouth 3 (three) times a week     • Andra, Zingiber officinalis, (ANDRA PO) Take by mouth     • inFLIXimab (REMICADE) 100 mg Infuse into a venous catheter     • KRILL OIL PO Take by mouth     • lisinopril (ZESTRIL) 5 mg tablet Take 5 mg by mouth daily       • meloxicam (MOBIC) 15 mg tablet Take 7.5 mg by mouth daily     • metFORMIN (GLUCOPHAGE) 500 mg tablet TAKE 2 TABLETS BY MOUTH IN THE MORNING AND 2 TABLETS BY MOUTH IN THE EVENING 360 tablet 3   • metoprolol tartrate (LOPRESSOR) 25 mg tablet Take 25 mg by mouth 2 (two) times a day     • Microlet Lancets MISC TEST ONCE DAILY 100 each 3   • potassium chloride (MICRO-K) 10 MEQ CR capsule Take 20 mEq by mouth 2 (two) times a day      • triamcinolone (KENALOG) 0.1 % cream      • Trulicity 1.5 MG/0.5ML injection INJECT THE CONTENTS OF 1  PEN SUBCUTANEOUSLY WEEKLY  AS DIRECTED 6 mL 3     No current facility-administered medications for this visit.     Allergies   Allergen Reactions   • Digoxin Nausea Only, GI Intolerance and Vomiting     Other reaction(s): stomach upset   • Other      Other reaction(s): vomiting   • Prochlorperazine      Other reaction(s): stomach upset       Objective   Vitals: Blood pressure 122/80, pulse 68, height 5' 7.25\" (1.708 m), weight 98.3 kg (216 lb 12.8 oz).    Physical Exam  Vitals and nursing note reviewed.   Constitutional:       General: She is not in acute distress.     Appearance: Normal appearance. She is not diaphoretic.   HENT:      Head: Normocephalic and atraumatic.   Eyes:      General: No scleral icterus.     Extraocular Movements: Extraocular movements intact.      Conjunctiva/sclera: Conjunctivae normal.      Pupils: Pupils are equal, round, and reactive to light.   Cardiovascular:      Rate and Rhythm: Normal rate and regular rhythm.      Heart sounds: No " murmur heard.  Pulmonary:      Effort: Pulmonary effort is normal. No respiratory distress.      Breath sounds: Normal breath sounds. No wheezing.   Musculoskeletal:      Cervical back: Normal range of motion.      Right lower leg: No edema.      Left lower leg: No edema.   Lymphadenopathy:      Cervical: No cervical adenopathy.   Neurological:      Mental Status: She is alert and oriented to person, place, and time.      Sensory: No sensory deficit.      Gait: Gait abnormal (utilizes a cane).   Psychiatric:         Mood and Affect: Mood normal.         Behavior: Behavior normal.         Thought Content: Thought content normal.         The history was obtained from the review of the chart, patient.    Lab Results:   Lab Results   Component Value Date/Time    Hemoglobin A1C 6.9 (H) 04/19/2024 09:19 AM    Hemoglobin A1C 6.5 (H) 12/19/2023 10:06 AM    Hemoglobin A1C 6.2 (H) 08/16/2023 09:02 AM    BUN 17 04/19/2024 09:19 AM    BUN 19 12/19/2023 10:06 AM    BUN 18 08/16/2023 09:02 AM    Potassium 4.6 04/19/2024 09:19 AM    Potassium 4.3 12/19/2023 10:06 AM    Potassium 4.8 08/16/2023 09:02 AM    Chloride 97 04/19/2024 09:19 AM    Chloride 100 12/19/2023 10:06 AM    Chloride 97 08/16/2023 09:02 AM    CO2 19 (L) 04/19/2024 09:19 AM    CO2 24 12/19/2023 10:06 AM    CO2 24 08/16/2023 09:02 AM    Creatinine 0.77 04/19/2024 09:19 AM    Creatinine 0.70 12/19/2023 10:06 AM    Creatinine 0.73 08/16/2023 09:02 AM    AST 24 04/19/2024 09:19 AM    AST 15 12/19/2023 10:06 AM    AST 17 08/16/2023 09:02 AM    ALT 17 04/19/2024 09:19 AM    ALT 16 12/19/2023 10:06 AM    ALT 17 08/16/2023 09:02 AM    Protein, Total 7.7 04/19/2024 09:19 AM    Protein, Total 7.0 12/19/2023 10:06 AM    Protein, Total 7.4 08/16/2023 09:02 AM    Albumin 4.5 04/19/2024 09:19 AM    Albumin 4.2 12/19/2023 10:06 AM    Albumin 4.5 08/16/2023 09:02 AM    Globulin, Total 3.2 04/19/2024 09:19 AM    Globulin, Total 2.8 12/19/2023 10:06 AM    Globulin, Total 2.9  "08/16/2023 09:02 AM    HDL 70 08/16/2023 09:02 AM    Triglycerides 158 (H) 08/16/2023 09:02 AM       Portions of the record may have been created with voice recognition software. Occasional wrong word or \"sound a like\" substitutions may have occurred due to the inherent limitations of voice recognition software. Read the chart carefully and recognize, using context, where substitutions have occurred.    "

## 2024-05-02 NOTE — PATIENT INSTRUCTIONS
Continue to monitor diet, and maintain physical activity.       Continue with metformin 1000 mg twice a day, and Trulicity 1.5 mg weekly.     Can consider using glimepiride in the future if blood sugars continue to increase.     Follow-up in 4 months with lab work completed prior to visit.

## 2024-08-31 LAB
ALBUMIN SERPL-MCNC: 4.1 G/DL (ref 3.9–4.9)
ALBUMIN/CREAT UR: <18 MG/G CREAT (ref 0–29)
ALP SERPL-CCNC: 53 IU/L (ref 44–121)
ALT SERPL-CCNC: 18 IU/L (ref 0–32)
AST SERPL-CCNC: 16 IU/L (ref 0–40)
BILIRUB SERPL-MCNC: 0.8 MG/DL (ref 0–1.2)
BUN SERPL-MCNC: 21 MG/DL (ref 8–27)
BUN/CREAT SERPL: 31 (ref 12–28)
CALCIUM SERPL-MCNC: 9.7 MG/DL (ref 8.7–10.3)
CHLORIDE SERPL-SCNC: 99 MMOL/L (ref 96–106)
CHOLEST SERPL-MCNC: 180 MG/DL (ref 100–199)
CO2 SERPL-SCNC: 26 MMOL/L (ref 20–29)
CREAT SERPL-MCNC: 0.68 MG/DL (ref 0.57–1)
CREAT UR-MCNC: 16.5 MG/DL
EGFR: 96 ML/MIN/1.73
GLOBULIN SER-MCNC: 3.1 G/DL (ref 1.5–4.5)
GLUCOSE SERPL-MCNC: 169 MG/DL (ref 70–99)
HBA1C MFR BLD: 7.1 % (ref 4.8–5.6)
HDLC SERPL-MCNC: 73 MG/DL
LDLC SERPL CALC-MCNC: 72 MG/DL (ref 0–99)
MICROALBUMIN UR-MCNC: <3 UG/ML
POTASSIUM SERPL-SCNC: 5.2 MMOL/L (ref 3.5–5.2)
PROT SERPL-MCNC: 7.2 G/DL (ref 6–8.5)
SL AMB VLDL CHOLESTEROL CALC: 35 MG/DL (ref 5–40)
SODIUM SERPL-SCNC: 136 MMOL/L (ref 134–144)
TRIGL SERPL-MCNC: 220 MG/DL (ref 0–149)
TSH SERPL DL<=0.005 MIU/L-ACNC: 1.55 UIU/ML (ref 0.45–4.5)

## 2024-09-10 ENCOUNTER — OFFICE VISIT (OUTPATIENT)
Dept: ENDOCRINOLOGY | Facility: HOSPITAL | Age: 64
End: 2024-09-10
Payer: MEDICARE

## 2024-09-10 VITALS
SYSTOLIC BLOOD PRESSURE: 128 MMHG | WEIGHT: 213 LBS | BODY MASS INDEX: 33.43 KG/M2 | OXYGEN SATURATION: 97 % | DIASTOLIC BLOOD PRESSURE: 62 MMHG | HEIGHT: 67 IN | HEART RATE: 61 BPM

## 2024-09-10 DIAGNOSIS — E11.9 TYPE 2 DIABETES MELLITUS WITHOUT COMPLICATION, WITHOUT LONG-TERM CURRENT USE OF INSULIN (HCC): Primary | ICD-10-CM

## 2024-09-10 DIAGNOSIS — I10 ESSENTIAL HYPERTENSION: ICD-10-CM

## 2024-09-10 DIAGNOSIS — E04.2 MULTIPLE THYROID NODULES: ICD-10-CM

## 2024-09-10 DIAGNOSIS — E78.5 HYPERLIPIDEMIA, UNSPECIFIED HYPERLIPIDEMIA TYPE: ICD-10-CM

## 2024-09-10 PROCEDURE — 99214 OFFICE O/P EST MOD 30 MIN: CPT | Performed by: PHYSICIAN ASSISTANT

## 2024-09-10 NOTE — PROGRESS NOTES
Lillie Carmichael 64 y.o. female MRN: [id number]    Encounter: [id number]      Assessment & Plan     Assessment:  This is a 64 y.o.-year-old female with type 2 diabetes with hypertension and hyperlipidemia, and thyroid nodules.    Plan:  1. Type 2 diabetes: Most recent hemoglobin A1c is 7.1.  A1c and glucose levels continue to slowly trend up.  Did discuss different options we can do to help improve her glucose levels.  At this time we will increase her Trulicity to 3 mg weekly.  She is getting prescription through [facility] so we will send over paperwork.  She will continue metformin 1000 mg twice a day.  Continue checking glucose levels 2 or more times a day.  Contact the office with any concerns or questions.  Follow-up in 4 months with lab work completed prior to visit.     2. Thyroid nodules: No neck compressive symptoms at this time.  No need for routine follow-up of ultrasound.  If symptoms change, please contact the office and we will order ultrasound.     3. Hypertension:  Normotensive in the office today.  Kidney function remained stable with normal electrolytes.  Continue with Lasix, metoprolol, lisinopril.  Repeat CMP prior to next office visit.     4. Hyperlipidemia:  Recent lipid panel looks excellent.  Continue with atorvastatin 40 mg daily.  Completed prior to the next office visit    CC: Type 2 diabetes follow-up    History of Present Illness     HPI:  Lillie Carmichael is a 64 year old female with type 2 diabetes for about 10 years.  She is on oral agents at home and takes Metformin 1000 mg in the am and pm, Trulicity 1.5 mg weekly. She denies any polyuria, polydipsia, nocturia and blurry vision.  She denies neuropathy, nephropathy and retinopathy.  She is continuing to watch her diet and has been using the [employer] juana, which has helped with weight loss.  For a while she did increase her physical activity, but the past month with her upper respiratory infection has once again decreased.  States that she  has lost her job since last office visit.  She is working 2 part-time jobs at this time.  Denies any new chronic medical conditions.  Is routinely following up with cardiology and rheumatology. Started reclast in September 2023 for osteoporosis.  Doing well today.  Is still trying to remain physically active at this time.  Still under a lot of stress.  Is concerned about her glucose levels continuing to increase.     Hypoglycemic episodes: No.     The patient's last eye exam was in April 2023.  The patient's last foot exam was in December 2020 in our office.     Blood Sugar/Glucometer/Pump/CGM review: The day.  Fasting blood sugars are typically running higher and are between 150 and 170.  Later in the day glucose levels are doing much better and typically in the low to mid 100s.     She also has history of thyroid nodules with a negative biopsy in the fall of 2017 at [place].  She has not gone for updated thyroid ultrasound.     For her history of hypertension she is treated with lasix, lisinopril and metoprolol.  Hyperlipidemia she continues on atorvastatin 40 mg daily.  Denies any headaches, dizziness, MI or stroke-like symptoms.     Review of Systems   Constitutional:  Negative for activity change, appetite change, fatigue and unexpected weight change.   HENT:  Negative for sore throat and trouble swallowing.    Eyes:  Negative for visual disturbance.   Respiratory:  Negative for chest tightness and shortness of breath.    Cardiovascular:  Negative for chest pain, palpitations and leg swelling.   Gastrointestinal:  Negative for abdominal pain, constipation, diarrhea, nausea and vomiting.   Endocrine: Negative for cold intolerance, heat intolerance, polydipsia, polyphagia and polyuria.   Genitourinary:  Negative for frequency.   Musculoskeletal:  Positive for arthralgias and gait problem (Utilizes a cane).   Skin:  Negative for wound.   Neurological:  Negative for dizziness, weakness, numbness and headaches.    Psychiatric/Behavioral:  Negative for dysphoric mood and sleep disturbance. The patient is not nervous/anxious.        Historical Information   Past Medical History:   Diagnosis Date    Neuropathy      Past Surgical History:   Procedure Laterality Date    ANKLE SURGERY Left 2002    KNEE SURGERY Bilateral 2002    SINUS SURGERY  1995     Social History   Social History     Substance and Sexual Activity   Alcohol Use No     Social History     Substance and Sexual Activity   Drug Use No     Social History     Tobacco Use   Smoking Status Never   Smokeless Tobacco Never     Family History:   Family History   Problem Relation Age of Onset    Stroke Mother     Dementia Mother     Diabetes unspecified Father     Heart disease Father     Diabetes type II Father         Age 48 discovered    Hyperlipidemia Father     Arthritis Family     Cancer Family     Diabetes unspecified Family     Heart disease Family        Meds/Allergies   Current Outpatient Medications   Medication Sig Dispense Refill    acetaminophen (TYLENOL) 500 mg tablet Take 500 mg by mouth every 6 (six) hours as needed for mild pain      Albuterol Sulfate 108 (90 Base) MCG/ACT AEPB Inhale 2 puffs      Alpha-Lipoic Acid 100 MG CAPS Take by mouth      aspirin (ECOTRIN LOW STRENGTH) 81 mg EC tablet Take 81 mg by mouth daily      atorvastatin (LIPITOR) 40 mg tablet Take 40 mg by mouth daily      benzonatate (TESSALON PERLES) 100 mg capsule Take 100 mg by mouth 3 (three) times a day as needed for cough      Cholecalciferol (VITAMIN D) 2000 units CAPS Take by mouth      Contour Next Test test strip Use to check glucose levels every day. 100 strip 3    Diclofenac Sodium 1 % CREA Place on the skin      Docusate Calcium (STOOL SOFTENER PO) Take by mouth      DULoxetine (CYMBALTA) 60 mg delayed release capsule Take 60 mg by mouth daily      furosemide (LASIX) 20 mg tablet Take 20 mg by mouth 3 (three) times a week      Ginger, Zingiber officinalis, (GINGER PO) Take by  "mouth      inFLIXimab (REMICADE) 100 mg Infuse into a venous catheter      KRILL OIL PO Take by mouth      lisinopril (ZESTRIL) 5 mg tablet Take 5 mg by mouth daily        meloxicam (MOBIC) 15 mg tablet Take 7.5 mg by mouth daily      metFORMIN (GLUCOPHAGE) 500 mg tablet TAKE 2 TABLETS BY MOUTH IN THE MORNING AND 2 TABLETS BY MOUTH IN THE EVENING 360 tablet 3    metoprolol tartrate (LOPRESSOR) 25 mg tablet Take 25 mg by mouth 2 (two) times a day      Microlet Lancets MISC TEST ONCE DAILY 100 each 3    potassium chloride (MICRO-K) 10 MEQ CR capsule Take 20 mEq by mouth 2 (two) times a day       triamcinolone (KENALOG) 0.1 % cream       Trulicity 1.5 MG/0.5ML injection INJECT THE CONTENTS OF 1  PEN SUBCUTANEOUSLY WEEKLY  AS DIRECTED 6 mL 3     No current facility-administered medications for this visit.     Allergies   Allergen Reactions    Digoxin Nausea Only, GI Intolerance and Vomiting     Other reaction(s): stomach upset    Other      Other reaction(s): vomiting    Prochlorperazine      Other reaction(s): stomach upset       Objective   Vitals: Blood pressure 128/62, pulse 61, height 5' 7.25\" (1.708 m), weight 96.6 kg (213 lb), SpO2 97%.    Physical Exam  Vitals and nursing note reviewed.   Constitutional:       General: She is not in acute distress.  HENT:      Head: Normocephalic and atraumatic.   Eyes:      Pupils: Pupils are equal, round, and reactive to light.   Cardiovascular:      Rate and Rhythm: Normal rate and regular rhythm.      Pulses: no weak pulses.           Dorsalis pedis pulses are 2+ on the right side and 2+ on the left side.        Posterior tibial pulses are 2+ on the right side and 2+ on the left side.      Heart sounds: No murmur heard.  Pulmonary:      Effort: Pulmonary effort is normal. No respiratory distress.      Breath sounds: Normal breath sounds. No wheezing.   Musculoskeletal:         General: No swelling.      Cervical back: Normal range of motion.   Feet:      Right foot:      " Skin integrity: Callus and dry skin present. No ulcer, skin breakdown, erythema or warmth.      Left foot:      Skin integrity: Callus and dry skin present. No ulcer, skin breakdown, erythema or warmth.   Lymphadenopathy:      Cervical: No cervical adenopathy.   Neurological:      Mental Status: She is alert and oriented to person, place, and time.      Sensory: No sensory deficit.   Psychiatric:         Mood and Affect: Mood normal.         Behavior: Behavior normal.         Thought Content: Thought content normal.     Patient's shoes and socks removed.    Right Foot/Ankle   Right Foot Inspection  Skin Exam: skin normal, skin intact, dry skin, callus and callus. No warmth, no erythema, no maceration, no abnormal color, no pre-ulcer and no ulcer.     Toe Exam: ROM and strength within normal limits. No tenderness, erythema and  no right toe deformity    Sensory   Proprioception: intact  Monofilament testing: intact    Vascular  Capillary refills: < 3 seconds  The right DP pulse is 2+. The right PT pulse is 2+.     Left Foot/Ankle  Left Foot Inspection  Skin Exam: skin normal, skin intact, dry skin and callus. No warmth, no erythema, no maceration, normal color, no pre-ulcer and no ulcer.     Toe Exam: ROM and strength within normal limits. No tenderness, no erythema and no left toe deformity.     Sensory   Proprioception: intact  Monofilament testing: intact    Vascular  Capillary refills: < 3 seconds  The left DP pulse is 2+. The left PT pulse is 2+.     Assign Risk Category  No deformity present  No loss of protective sensation  No weak pulses  Risk: 0        The history was obtained from the review of the chart, patient.    Lab Results:   Lab Results   Component Value Date/Time    Hemoglobin A1C 7.1 (H) 08/30/2024 09:05 AM    Hemoglobin A1C 6.9 (H) 04/19/2024 09:19 AM    Hemoglobin A1C 6.5 (H) 12/19/2023 10:06 AM    BUN 21 08/30/2024 09:05 AM    BUN 17 04/19/2024 09:19 AM    BUN 19 12/19/2023 10:06 AM    Potassium  "5.2 08/30/2024 09:05 AM    Potassium 4.6 04/19/2024 09:19 AM    Potassium 4.3 12/19/2023 10:06 AM    Chloride 99 08/30/2024 09:05 AM    Chloride 97 04/19/2024 09:19 AM    Chloride 100 12/19/2023 10:06 AM    CO2 26 08/30/2024 09:05 AM    CO2 19 (L) 04/19/2024 09:19 AM    CO2 24 12/19/2023 10:06 AM    Creatinine 0.68 08/30/2024 09:05 AM    Creatinine 0.77 04/19/2024 09:19 AM    Creatinine 0.70 12/19/2023 10:06 AM    AST 16 08/30/2024 09:05 AM    AST 24 04/19/2024 09:19 AM    AST 15 12/19/2023 10:06 AM    ALT 18 08/30/2024 09:05 AM    ALT 17 04/19/2024 09:19 AM    ALT 16 12/19/2023 10:06 AM    Protein, Total 7.2 08/30/2024 09:05 AM    Protein, Total 7.7 04/19/2024 09:19 AM    Protein, Total 7.0 12/19/2023 10:06 AM    Albumin 4.1 08/30/2024 09:05 AM    Albumin 4.5 04/19/2024 09:19 AM    Albumin 4.2 12/19/2023 10:06 AM    Globulin, Total 3.1 08/30/2024 09:05 AM    Globulin, Total 3.2 04/19/2024 09:19 AM    Globulin, Total 2.8 12/19/2023 10:06 AM    HDL 73 08/30/2024 09:05 AM    Triglycerides 220 (H) 08/30/2024 09:05 AM           Portions of the record may have been created with voice recognition software. Occasional wrong word or \"sound a like\" substitutions may have occurred due to the inherent limitations of voice recognition software. Read the chart carefully and recognize, using context, where substitutions have occurred.    "

## 2024-09-10 NOTE — PATIENT INSTRUCTIONS
Continue to monitor diet, and maintain physical activity.       Continue with metformin 1000 mg twice a day.    Increase Trulicity to 3 mg weekly.      Can consider using glimepiride in the future if blood sugars continue to increase.     Follow-up in 4 months with lab work completed prior to visit.

## 2024-09-11 ENCOUNTER — DOCUMENTATION (OUTPATIENT)
Dept: ENDOCRINOLOGY | Facility: HOSPITAL | Age: 64
End: 2024-09-11

## 2024-09-11 NOTE — PROGRESS NOTES
Patient gave the office her [employer] form and it was reviewed and signed off by the provider and faxed back to [phone]

## 2024-09-12 NOTE — PROGRESS NOTES
Received notification from [employer] that the form needed to be updated with the address.  I then filled out and faxed back to [phone]

## 2024-11-20 DIAGNOSIS — E11.9 TYPE 2 DIABETES MELLITUS WITHOUT COMPLICATION, WITHOUT LONG-TERM CURRENT USE OF INSULIN (HCC): ICD-10-CM

## 2024-11-21 ENCOUNTER — DOCUMENTATION (OUTPATIENT)
Dept: ENDOCRINOLOGY | Facility: HOSPITAL | Age: 64
End: 2024-11-21

## 2024-11-22 NOTE — PROGRESS NOTES
Received enrollment notification. Patient meets program eligibility requirements and is enrolled until the end of 2025

## 2024-12-30 LAB
ALBUMIN SERPL-MCNC: 4.4 G/DL (ref 3.9–4.9)
ALP SERPL-CCNC: 52 IU/L (ref 44–121)
ALT SERPL-CCNC: 20 IU/L (ref 0–32)
AST SERPL-CCNC: 19 IU/L (ref 0–40)
BILIRUB SERPL-MCNC: 0.9 MG/DL (ref 0–1.2)
BUN SERPL-MCNC: 17 MG/DL (ref 8–27)
BUN/CREAT SERPL: 24 (ref 12–28)
CALCIUM SERPL-MCNC: 9.9 MG/DL (ref 8.7–10.3)
CHLORIDE SERPL-SCNC: 99 MMOL/L (ref 96–106)
CO2 SERPL-SCNC: 24 MMOL/L (ref 20–29)
CREAT SERPL-MCNC: 0.7 MG/DL (ref 0.57–1)
EGFR: 95 ML/MIN/1.73
GLOBULIN SER-MCNC: 2.8 G/DL (ref 1.5–4.5)
GLUCOSE SERPL-MCNC: 150 MG/DL (ref 70–99)
HBA1C MFR BLD: 7.1 % (ref 4.8–5.6)
POTASSIUM SERPL-SCNC: 4.7 MMOL/L (ref 3.5–5.2)
PROT SERPL-MCNC: 7.2 G/DL (ref 6–8.5)
SODIUM SERPL-SCNC: 139 MMOL/L (ref 134–144)

## 2024-12-31 ENCOUNTER — RESULTS FOLLOW-UP (OUTPATIENT)
Dept: ENDOCRINOLOGY | Facility: HOSPITAL | Age: 64
End: 2024-12-31

## 2025-01-10 ENCOUNTER — OFFICE VISIT (OUTPATIENT)
Dept: ENDOCRINOLOGY | Facility: HOSPITAL | Age: 65
End: 2025-01-10
Payer: MEDICARE

## 2025-01-10 VITALS
OXYGEN SATURATION: 98 % | WEIGHT: 223.2 LBS | HEART RATE: 62 BPM | BODY MASS INDEX: 35.03 KG/M2 | HEIGHT: 67 IN | DIASTOLIC BLOOD PRESSURE: 74 MMHG | SYSTOLIC BLOOD PRESSURE: 118 MMHG

## 2025-01-10 DIAGNOSIS — E78.5 HYPERLIPIDEMIA, UNSPECIFIED HYPERLIPIDEMIA TYPE: ICD-10-CM

## 2025-01-10 DIAGNOSIS — I10 ESSENTIAL HYPERTENSION: ICD-10-CM

## 2025-01-10 DIAGNOSIS — E11.9 TYPE 2 DIABETES MELLITUS WITHOUT COMPLICATION, WITHOUT LONG-TERM CURRENT USE OF INSULIN (HCC): Primary | ICD-10-CM

## 2025-01-10 DIAGNOSIS — E04.2 MULTIPLE THYROID NODULES: ICD-10-CM

## 2025-01-10 PROCEDURE — 99214 OFFICE O/P EST MOD 30 MIN: CPT | Performed by: PHYSICIAN ASSISTANT

## 2025-01-10 RX ORDER — DULAGLUTIDE 3 MG/.5ML
3 INJECTION, SOLUTION SUBCUTANEOUS WEEKLY
Qty: 6 ML | Refills: 3 | Status: SHIPPED | OUTPATIENT
Start: 2025-01-10

## 2025-01-10 NOTE — PROGRESS NOTES
Lillie Carmichael 64 y.o. female MRN: [id number]    Encounter: [id number]      Assessment & Plan     Assessment:  This is a 64 y.o.-year-old female with type 2 diabetes with hypertension and hyperlipidemia, and thyroid nodules.    Plan:  1. Type 2 diabetes: Most recent hemoglobin A1c is 7.1.  Remained stable since last office visit.  Unfortunately she has yet to get her Trulicity 3 mg dose.  She does go through [employer] and should be getting a new shipment with that dose in a few weeks.  She will continue metformin 1000 mg twice a day.  Continue checking blood sugars twice a day.  If there is any concerns, please contact the office.  Continue working on lifestyle changes to help improve glucose levels.  Follow-up in 4 months with labwork completed prior to visit.     2. Thyroid nodules: No neck compressive symptoms at this time.  No need for routine follow-up of ultrasound.  If symptoms change, please contact the office and we will order ultrasound.     3. Hypertension:  Normotensive in the office today.  Kidney function remained stable with normal electrolytes.  Continue with Lasix, metoprolol, lisinopril.  Repeat CMP prior to next office visit.     4. Hyperlipidemia:  Recent lipid panel looks excellent.  Continue with atorvastatin 40 mg daily.  Completed prior to the next office visit    CC: Type 2 diabetes follow-up    History of Present Illness     HPI:  Lillie Carmichael is a 64 year old female with type 2 diabetes for about 10 years.  She is on oral agents at home and takes Metformin 1000 mg in the am and pm, Trulicity 1.5 mg weekly. She denies any polyuria, polydipsia, nocturia and blurry vision.  She denies neuropathy, nephropathy and retinopathy.  She is continuing to watch her diet and has been using the [employer] juana, which has helped with weight loss.  Is still trying to find ways of increasing her physical activity.  At this time she is only working 1 part-time job, but is looking for a second 1 to supplement  her income.  Denies any new chronic medical conditions.  Is routinely following up with cardiology and rheumatology. Started reclast in September 2023 for osteoporosis.  Doing well today.     Hypoglycemic episodes: No.     The patient's last eye exam was in April 2023.  The patient's last foot exam was in December 2020 in our office.     Blood Sugar/Glucometer/Pump/CGM review: No blood sugar logs present at today's office visit.     She also has history of thyroid nodules with a negative biopsy in the fall of 2017 at [place].  She has not gone for updated thyroid ultrasound.     For her history of hypertension she is treated with lasix, lisinopril and metoprolol.  Hyperlipidemia she continues on atorvastatin 40 mg daily.  Denies any headaches, dizziness, MI or stroke-like symptoms.     Review of Systems   Constitutional:  Negative for activity change, appetite change, fatigue and unexpected weight change.   HENT:  Negative for sore throat and trouble swallowing.    Eyes:  Negative for visual disturbance.   Respiratory:  Negative for chest tightness and shortness of breath.    Cardiovascular:  Negative for chest pain, palpitations and leg swelling.   Gastrointestinal:  Negative for abdominal pain, constipation, diarrhea, nausea and vomiting.   Endocrine: Negative for cold intolerance, heat intolerance, polydipsia, polyphagia and polyuria.   Genitourinary:  Negative for frequency.   Musculoskeletal:  Positive for arthralgias and gait problem (Utilizes a cane).   Skin:  Negative for wound.   Neurological:  Negative for dizziness, weakness, numbness and headaches.   Psychiatric/Behavioral:  Negative for dysphoric mood and sleep disturbance. The patient is not nervous/anxious.        Historical Information   Past Medical History:   Diagnosis Date   • Neuropathy      Past Surgical History:   Procedure Laterality Date   • ANKLE SURGERY Left 2002   • KNEE SURGERY Bilateral 2002   • SINUS SURGERY  1995     Social History    Social History     Substance and Sexual Activity   Alcohol Use No     Social History     Substance and Sexual Activity   Drug Use No     Social History     Tobacco Use   Smoking Status Never   Smokeless Tobacco Never     Family History:   Family History   Problem Relation Age of Onset   • Stroke Mother    • Dementia Mother    • Diabetes unspecified Father    • Heart disease Father    • Diabetes type II Father         Age 48 discovered   • Hyperlipidemia Father    • Arthritis Family    • Cancer Family    • Diabetes unspecified Family    • Heart disease Family        Meds/Allergies   Current Outpatient Medications   Medication Sig Dispense Refill   • acetaminophen (TYLENOL) 500 mg tablet Take 500 mg by mouth every 6 (six) hours as needed for mild pain     • Albuterol Sulfate 108 (90 Base) MCG/ACT AEPB Inhale 2 puffs     • Alpha-Lipoic Acid 100 MG CAPS Take by mouth     • aspirin (ECOTRIN LOW STRENGTH) 81 mg EC tablet Take 81 mg by mouth daily     • atorvastatin (LIPITOR) 40 mg tablet Take 40 mg by mouth daily     • benzonatate (TESSALON PERLES) 100 mg capsule Take 100 mg by mouth 3 (three) times a day as needed for cough     • Cholecalciferol (VITAMIN D) 2000 units CAPS Take by mouth     • Contour Next Test test strip Use to check glucose levels every day. 100 strip 3   • Diclofenac Sodium 1 % CREA Place on the skin     • Docusate Calcium (STOOL SOFTENER PO) Take by mouth     • DULoxetine (CYMBALTA) 60 mg delayed release capsule Take 60 mg by mouth daily     • furosemide (LASIX) 20 mg tablet Take 20 mg by mouth 3 (three) times a week     • Ginger, Zingiber officinalis, (GINGER PO) Take by mouth     • inFLIXimab (REMICADE) 100 mg Infuse into a venous catheter     • KRILL OIL PO Take by mouth     • lisinopril (ZESTRIL) 5 mg tablet Take 5 mg by mouth daily       • meloxicam (MOBIC) 15 mg tablet Take 7.5 mg by mouth daily     • metFORMIN (GLUCOPHAGE) 500 mg tablet TAKE 2 TABLETS BY MOUTH IN THE MORNING AND 2 TABLETS BY  "MOUTH IN THE EVENING 360 tablet 1   • metoprolol tartrate (LOPRESSOR) 25 mg tablet Take 25 mg by mouth 2 (two) times a day     • Microlet Lancets MISC TEST ONCE DAILY 100 each 3   • potassium chloride (MICRO-K) 10 MEQ CR capsule Take 20 mEq by mouth 2 (two) times a day      • triamcinolone (KENALOG) 0.1 % cream      • Trulicity 1.5 MG/0.5ML injection INJECT THE CONTENTS OF 1  PEN SUBCUTANEOUSLY WEEKLY  AS DIRECTED 6 mL 3   • Dulaglutide (Trulicity) 3 MG/0.5ML SOAJ Inject 3 mg under the skin once a week 6 mL 3     No current facility-administered medications for this visit.     Allergies   Allergen Reactions   • Digoxin Nausea Only, GI Intolerance and Vomiting     Other reaction(s): stomach upset   • Other      Other reaction(s): vomiting   • Prochlorperazine      Other reaction(s): stomach upset       Objective   Vitals: Blood pressure 118/74, pulse 62, height 5' 7.25\" (1.708 m), weight 101 kg (223 lb 3.2 oz), SpO2 98%.    Physical Exam  Vitals and nursing note reviewed.   Constitutional:       General: She is not in acute distress.     Appearance: Normal appearance. She is not diaphoretic.   HENT:      Head: Normocephalic and atraumatic.   Eyes:      General: No scleral icterus.     Extraocular Movements: Extraocular movements intact.      Conjunctiva/sclera: Conjunctivae normal.      Pupils: Pupils are equal, round, and reactive to light.   Cardiovascular:      Rate and Rhythm: Normal rate and regular rhythm.      Heart sounds: No murmur heard.  Pulmonary:      Effort: Pulmonary effort is normal. No respiratory distress.      Breath sounds: Normal breath sounds. No wheezing.   Musculoskeletal:      Cervical back: Normal range of motion.      Right lower leg: No edema.      Left lower leg: No edema.   Lymphadenopathy:      Cervical: No cervical adenopathy.   Neurological:      Mental Status: She is alert and oriented to person, place, and time. Mental status is at baseline.      Sensory: No sensory deficit.      " "Gait: Gait abnormal (utilizing a cane).   Psychiatric:         Mood and Affect: Mood normal.         Behavior: Behavior normal.         Thought Content: Thought content normal.         The history was obtained from the review of the chart, patient.    Lab Results:   Lab Results   Component Value Date/Time    Hemoglobin A1C 7.1 (H) 12/30/2024 09:47 AM    Hemoglobin A1C 7.1 (H) 08/30/2024 09:05 AM    Hemoglobin A1C 6.9 (H) 04/19/2024 09:19 AM    BUN 17 12/30/2024 09:47 AM    BUN 21 08/30/2024 09:05 AM    BUN 17 04/19/2024 09:19 AM    Potassium 4.7 12/30/2024 09:47 AM    Potassium 5.2 08/30/2024 09:05 AM    Potassium 4.6 04/19/2024 09:19 AM    Chloride 99 12/30/2024 09:47 AM    Chloride 99 08/30/2024 09:05 AM    Chloride 97 04/19/2024 09:19 AM    CO2 24 12/30/2024 09:47 AM    CO2 26 08/30/2024 09:05 AM    CO2 19 (L) 04/19/2024 09:19 AM    Creatinine 0.70 12/30/2024 09:47 AM    Creatinine 0.68 08/30/2024 09:05 AM    Creatinine 0.77 04/19/2024 09:19 AM    AST 19 12/30/2024 09:47 AM    AST 16 08/30/2024 09:05 AM    AST 24 04/19/2024 09:19 AM    ALT 20 12/30/2024 09:47 AM    ALT 18 08/30/2024 09:05 AM    ALT 17 04/19/2024 09:19 AM    Protein, Total 7.2 12/30/2024 09:47 AM    Protein, Total 7.2 08/30/2024 09:05 AM    Protein, Total 7.7 04/19/2024 09:19 AM    Albumin 4.4 12/30/2024 09:47 AM    Albumin 4.1 08/30/2024 09:05 AM    Albumin 4.5 04/19/2024 09:19 AM    Globulin, Total 2.8 12/30/2024 09:47 AM    Globulin, Total 3.1 08/30/2024 09:05 AM    Globulin, Total 3.2 04/19/2024 09:19 AM    HDL 73 08/30/2024 09:05 AM    Triglycerides 220 (H) 08/30/2024 09:05 AM           Imaging Studies: Results Review Statement: No pertinent imaging studies reviewed.    Portions of the record may have been created with voice recognition software. Occasional wrong word or \"sound a like\" substitutions may have occurred due to the inherent limitations of voice recognition software. Read the chart carefully and recognize, using context, where " substitutions have occurred.

## 2025-01-10 NOTE — PATIENT INSTRUCTIONS
Continue to monitor diet, and maintain physical activity.       Continue with metformin 1000 mg twice a day.     Increase Trulicity to 3 mg weekly.      Can consider using glimepiride in the future if blood sugars continue to increase.     Follow-up in 4 months with lab work completed prior to visit.   Writer spoke with patients  Felicita Smith and went over all the information that was given to the patient and resent the information for the [employer] as well.

## 2025-01-13 ENCOUNTER — TELEPHONE (OUTPATIENT)
Age: 65
End: 2025-01-13

## 2025-01-13 NOTE — TELEPHONE ENCOUNTER
PA for Trulicity 3 MG/0.5ML  NOT REQUIRED     Reason         Patient advised by          [] MyChart Message  [] Phone call   []LMOM  []L/M to call office as no active Communication consent on file  []Unable to leave detailed message as VM not approved on Communication consent       Pharmacy advised by    [x]Fax  []Phone call

## 2025-01-13 NOTE — TELEPHONE ENCOUNTER
PA for Trulicity) 3 MG/0.5ML SUBMITTED to [facility]    via    [x]CMM-KEY: [id number]  [x]PA sent as URGENT    All office notes, labs and other pertaining documents and studies sent. Clinical questions answered. Awaiting determination from insurance company.     Turnaround time for your insurance to make a decision on your Prior Authorization can take 7-21 business days.

## 2025-02-10 RX ORDER — DULAGLUTIDE 1.5 MG/.5ML
INJECTION, SOLUTION SUBCUTANEOUS
Qty: 8 ML | Refills: 0 | OUTPATIENT
Start: 2025-02-10

## 2025-02-10 NOTE — TELEPHONE ENCOUNTER
Please refuse. Spoke with [employer] and they will be shipping the Trulicity 3 mg dose this week.

## 2025-05-15 ENCOUNTER — RESULTS FOLLOW-UP (OUTPATIENT)
Dept: ENDOCRINOLOGY | Facility: HOSPITAL | Age: 65
End: 2025-05-15

## 2025-05-15 LAB
ALBUMIN SERPL-MCNC: 4.7 G/DL (ref 3.9–4.9)
ALP SERPL-CCNC: 62 IU/L (ref 44–121)
ALT SERPL-CCNC: 26 IU/L (ref 0–32)
AST SERPL-CCNC: 22 IU/L (ref 0–40)
BILIRUB SERPL-MCNC: 1.3 MG/DL (ref 0–1.2)
BUN SERPL-MCNC: 11 MG/DL (ref 8–27)
BUN/CREAT SERPL: 14 (ref 12–28)
CALCIUM SERPL-MCNC: 10.5 MG/DL (ref 8.7–10.3)
CHLORIDE SERPL-SCNC: 96 MMOL/L (ref 96–106)
CHOLEST SERPL-MCNC: 152 MG/DL (ref 100–199)
CO2 SERPL-SCNC: 22 MMOL/L (ref 20–29)
CREAT SERPL-MCNC: 0.77 MG/DL (ref 0.57–1)
EGFR: 84 ML/MIN/1.73
GLOBULIN SER-MCNC: 3 G/DL (ref 1.5–4.5)
GLUCOSE SERPL-MCNC: 145 MG/DL (ref 70–99)
HBA1C MFR BLD: 6.9 % (ref 4.8–5.6)
HDLC SERPL-MCNC: 62 MG/DL
LDLC SERPL CALC-MCNC: 59 MG/DL (ref 0–99)
POTASSIUM SERPL-SCNC: 4.4 MMOL/L (ref 3.5–5.2)
PROT SERPL-MCNC: 7.7 G/DL (ref 6–8.5)
SL AMB VLDL CHOLESTEROL CALC: 31 MG/DL (ref 5–40)
SODIUM SERPL-SCNC: 137 MMOL/L (ref 134–144)
TRIGL SERPL-MCNC: 188 MG/DL (ref 0–149)

## 2025-05-21 ENCOUNTER — OFFICE VISIT (OUTPATIENT)
Dept: ENDOCRINOLOGY | Facility: HOSPITAL | Age: 65
End: 2025-05-21
Payer: MEDICARE

## 2025-05-21 VITALS
HEART RATE: 63 BPM | HEIGHT: 67 IN | OXYGEN SATURATION: 96 % | DIASTOLIC BLOOD PRESSURE: 68 MMHG | WEIGHT: 217 LBS | SYSTOLIC BLOOD PRESSURE: 104 MMHG | BODY MASS INDEX: 34.06 KG/M2

## 2025-05-21 DIAGNOSIS — E04.2 MULTIPLE THYROID NODULES: ICD-10-CM

## 2025-05-21 DIAGNOSIS — I10 ESSENTIAL HYPERTENSION: ICD-10-CM

## 2025-05-21 DIAGNOSIS — E78.5 HYPERLIPIDEMIA, UNSPECIFIED HYPERLIPIDEMIA TYPE: ICD-10-CM

## 2025-05-21 DIAGNOSIS — E11.9 TYPE 2 DIABETES MELLITUS WITHOUT COMPLICATION, WITHOUT LONG-TERM CURRENT USE OF INSULIN (HCC): Primary | ICD-10-CM

## 2025-05-21 PROCEDURE — 99214 OFFICE O/P EST MOD 30 MIN: CPT | Performed by: PHYSICIAN ASSISTANT

## 2025-05-21 PROCEDURE — G2211 COMPLEX E/M VISIT ADD ON: HCPCS | Performed by: PHYSICIAN ASSISTANT

## 2025-05-21 NOTE — ASSESSMENT & PLAN NOTE
Most recent hemoglobin A1c has improved.  At this time she will continue with metformin 1000 mg twice a day and Trulicity 3 mg weekly.  She is going to work on lifestyle modifications to help assist with glucose levels, and also weight loss.  Continue checking blood sugars at least daily.  Contact the office with any concerns or questions.  Follow-up in 4 months with labwork completed prior to visit.  Lab Results   Component Value Date    HGBA1C 6.9 (H) 05/14/2025       Orders:  •  metFORMIN (GLUCOPHAGE) 500 mg tablet; Take 2 tablets (1,000 mg total) by mouth 2 (two) times a day Morning and evening  •  Hemoglobin A1C; Future  •  Comprehensive metabolic panel; Future

## 2025-05-21 NOTE — ASSESSMENT & PLAN NOTE
No changes in nodule size on physical exam.  Last ultrasound was 2018.  No need for repeat ultrasound at this time.

## 2025-05-21 NOTE — ASSESSMENT & PLAN NOTE
Normotensive in the office today.  Kidney function remained stable normal electrolytes.  Continue with Lasix 20 mg 3 times a week and lisinopril 5 mg daily.

## 2025-05-21 NOTE — PROGRESS NOTES
Name: Lillie Carmichael      : 1960      MRN: [id number]  Encounter Provider: Uri Cee PA-C  Encounter Date: 2025   Encounter department: [facility]    No chief complaint on file.  :  Assessment & Plan  Type 2 diabetes mellitus without complication, without long-term current use of insulin (HCC)  Most recent hemoglobin A1c has improved.  At this time she will continue with metformin 1000 mg twice a day and Trulicity 3 mg weekly.  She is going to work on lifestyle modifications to help assist with glucose levels, and also weight loss.  Continue checking blood sugars at least daily.  Contact the office with any concerns or questions.  Follow-up in 4 months with labwork completed prior to visit.  Lab Results   Component Value Date    HGBA1C 6.9 (H) 2025       Orders:  •  metFORMIN (GLUCOPHAGE) 500 mg tablet; Take 2 tablets (1,000 mg total) by mouth 2 (two) times a day Morning and evening  •  Hemoglobin A1C; Future  •  Comprehensive metabolic panel; Future    Essential hypertension  Normotensive in the office today.  Kidney function remained stable normal electrolytes.  Continue with Lasix 20 mg 3 times a week and lisinopril 5 mg daily.       Hyperlipidemia, unspecified hyperlipidemia type  Recent lipid panel was excellent.  Continue with atorvastatin 40 mg daily.       Multiple thyroid nodules  No changes in nodule size on physical exam.  Last ultrasound was .  No need for repeat ultrasound at this time.           History of Present Illness     Lillie Carmichael is a 65 y.o. female with type 2 diabetes for about 10 years.  She is on oral agents at home and takes Metformin 1000 mg in the am and pm, Trulicity 3 mg weekly. She denies any polyuria, polydipsia, nocturia and blurry vision.  She denies neuropathy, nephropathy and retinopathy.  Does state the neuropathy has improved with the improved blood sugars.  She is continuing to watch her  diet and has been using the Precognate juana, which has helped with weight loss.  Is still trying to find ways of increasing her physical activity.  Is planning on going to the [employer] to swim to help with blood sugars and weight.  Has been doing well with the increase in her Trulicity.  At this time she is only working 1 part-time job, does not appear to be looking for a second part-time job at this time.  Denies any new chronic medical conditions.  Is routinely following up with cardiology and rheumatology. Started reclast in September 2023 for osteoporosis. Has continued issues with hands that come and go. Doing well today.     Hypoglycemic episodes: No.     The patient's last eye exam was in April 2023.  The patient's last foot exam was in December 2020 in our office.     Blood Sugar/Glucometer/Pump/CGM review: No blood sugar logs present at today's office visit.     She also has history of thyroid nodules with a negative biopsy in the fall of 2017 at [place].  She has not gone for updated thyroid ultrasound.     For her history of hypertension she is treated with lasix, lisinopril and metoprolol.  Hyperlipidemia she continues on atorvastatin 40 mg daily.  Denies any headaches, dizziness, MI or stroke-like symptoms.    Current regimen:   Metformin 1000 mg twice a day and Trulicity 3 mg weekly      Last Eye Exam: 04/26/2023  Last Foot Exam: 09/10/2024  Health Maintenance   Topic Date Due   • Diabetic Eye Exam  09/10/2025 (Originally 4/26/2024)   • Diabetic Foot Exam  09/10/2025       Review of Systems   Constitutional:  Negative for activity change, appetite change, fatigue and unexpected weight change.   HENT:  Negative for sore throat and trouble swallowing.    Eyes:  Negative for visual disturbance.   Respiratory:  Negative for chest tightness and shortness of breath.    Cardiovascular:  Negative for chest pain, palpitations and leg swelling.   Gastrointestinal:  Negative for abdominal pain, constipation, diarrhea,  "nausea and vomiting.   Endocrine: Negative for cold intolerance, heat intolerance, polydipsia, polyphagia and polyuria.   Genitourinary:  Negative for frequency.   Musculoskeletal:  Positive for arthralgias and gait problem (Utilizes a cane).   Skin:  Negative for wound.   Neurological:  Negative for dizziness, weakness, numbness and headaches.   Psychiatric/Behavioral:  Negative for dysphoric mood and sleep disturbance. The patient is not nervous/anxious.     as per HPI    Medical History Reviewed by provider this encounter:     .  Medications Ordered Prior to Encounter[1]   Social History[2]     Medical History Reviewed by provider this encounter:     .    Objective   /68   Pulse 63   Ht 5' 7.25\" (1.708 m)   Wt 98.4 kg (217 lb)   SpO2 96%   BMI 33.73 kg/m²      Body mass index is 33.73 kg/m².  Wt Readings from Last 3 Encounters:   05/21/25 98.4 kg (217 lb)   01/10/25 101 kg (223 lb 3.2 oz)   09/10/24 96.6 kg (213 lb)     Physical Exam  Vitals and nursing note reviewed.   Constitutional:       General: She is not in acute distress.     Appearance: Normal appearance. She is well-developed. She is not diaphoretic.     Eyes:      General: No scleral icterus.     Extraocular Movements: Extraocular movements intact.      Conjunctiva/sclera: Conjunctivae normal.      Pupils: Pupils are equal, round, and reactive to light.       Cardiovascular:      Rate and Rhythm: Normal rate and regular rhythm.      Pulses: Normal pulses.      Heart sounds: Normal heart sounds. No murmur heard.     No friction rub. No gallop.   Pulmonary:      Effort: Pulmonary effort is normal. No tachypnea, bradypnea or respiratory distress.      Breath sounds: Normal breath sounds. No wheezing.     Musculoskeletal:      Cervical back: Normal range of motion.      Right lower leg: No edema.      Left lower leg: No edema.   Lymphadenopathy:      Cervical: No cervical adenopathy.     Skin:     General: Skin is warm and dry.     Neurological: " "     Mental Status: She is alert and oriented to person, place, and time. Mental status is at baseline. She is not disoriented.      Motor: No abnormal muscle tone.      Gait: Gait normal.      Deep Tendon Reflexes: Reflexes are normal and symmetric.     Psychiatric:         Mood and Affect: Mood normal.         Behavior: Behavior normal.         Thought Content: Thought content normal.         Labs:   Lab Results   Component Value Date    HGBA1C 6.9 (H) 05/14/2025    HGBA1C 7.1 (H) 12/30/2024    HGBA1C 7.1 (H) 08/30/2024     Lab Results   Component Value Date    CREATININE 0.77 05/14/2025    CREATININE 0.70 12/30/2024    CREATININE 0.68 08/30/2024    BUN 11 05/14/2025    K 4.4 05/14/2025    CL 96 05/14/2025    CO2 22 05/14/2025     eGFR   Date Value Ref Range Status   05/14/2025 84 >59 mL/min/1.73 Final     Lab Results   Component Value Date    HDL 62 05/14/2025    TRIG 188 (H) 05/14/2025    CHOLHDL 2.5 08/02/2022     Lab Results   Component Value Date    ALT 26 05/14/2025    AST 22 05/14/2025     No results found for: \"[id number]\"    Patient Instructions   Continue to monitor diet, and maintain physical activity.       Continue with metformin 1000 mg twice a day.     Increase Trulicity to 3 mg weekly.      Can consider using glimepiride in the future if blood sugars continue to increase.     Follow-up in 4 months with lab work completed prior to visit.    Discussed with the patient and all questioned fully answered. She will call me if any problems arise.             [1]  Current Outpatient Medications on File Prior to Visit   Medication Sig Dispense Refill   • acetaminophen (TYLENOL) 500 mg tablet Take 500 mg by mouth every 6 (six) hours as needed for mild pain     • Albuterol Sulfate 108 (90 Base) MCG/ACT AEPB Inhale 2 puffs     • Alpha-Lipoic Acid 100 MG CAPS Take by mouth     • aspirin (ECOTRIN LOW STRENGTH) 81 mg EC tablet Take 81 mg by mouth in the morning.     • atorvastatin (LIPITOR) 40 mg tablet Take 40 " mg by mouth in the morning.     • benzonatate (TESSALON PERLES) 100 mg capsule Take 100 mg by mouth as needed in the morning and 100 mg as needed at noon and 100 mg as needed in the evening for cough.     • Cholecalciferol (VITAMIN D) 2000 units CAPS Take by mouth     • Contour Next Test test strip Use to check glucose levels every day. 100 strip 3   • Diclofenac Sodium 1 % CREA Place on the skin     • Docusate Calcium (STOOL SOFTENER PO) Take by mouth     • Dulaglutide (Trulicity) 3 MG/0.5ML SOAJ Inject 3 mg under the skin once a week 6 mL 3   • DULoxetine (CYMBALTA) 60 mg delayed release capsule Take 60 mg by mouth in the morning.     • furosemide (LASIX) 20 mg tablet Take 20 mg by mouth 3 (three) times a week     • Ginger, Zingiber officinalis, (GINGER PO) Take by mouth     • inFLIXimab (REMICADE) 100 mg Inject into a catheter in a vein     • KRILL OIL PO Take by mouth     • lisinopril (ZESTRIL) 5 mg tablet Take 5 mg by mouth in the morning.     • meloxicam (MOBIC) 15 mg tablet Take 7.5 mg by mouth in the morning.     • metoprolol tartrate (LOPRESSOR) 25 mg tablet Take 25 mg by mouth in the morning and 25 mg in the evening.     • Microlet Lancets MISC TEST ONCE DAILY 100 each 3   • potassium chloride (MICRO-K) 10 MEQ CR capsule Take 20 mEq by mouth in the morning and 20 mEq in the evening.     • triamcinolone (KENALOG) 0.1 % cream      • [DISCONTINUED] metFORMIN (GLUCOPHAGE) 500 mg tablet TAKE 2 TABLETS BY MOUTH IN THE MORNING AND 2 TABLETS BY MOUTH IN THE EVENING 360 tablet 1   • [DISCONTINUED] Trulicity 1.5 MG/0.5ML injection INJECT THE CONTENTS OF 1  PEN SUBCUTANEOUSLY WEEKLY  AS DIRECTED 6 mL 3     No current facility-administered medications on file prior to visit.   [2]  Social History  Tobacco Use   • Smoking status: Never   • Smokeless tobacco: Never   Vaping Use   • Vaping status: Never Used   Substance and Sexual Activity   • Alcohol use: No   • Drug use: No   • Sexual activity: Never

## 2025-08-05 ENCOUNTER — HOSPITAL ENCOUNTER (OUTPATIENT)
Age: 65
Setting detail: OUTPATIENT SURGERY
Discharge: HOME | End: 2025-08-05
Attending: OPHTHALMOLOGY | Admitting: OPHTHALMOLOGY
Payer: MEDICARE

## 2025-08-05 ENCOUNTER — ANESTHESIA (OUTPATIENT)
Age: 65
End: 2025-08-05
Payer: MEDICARE

## 2025-08-05 ENCOUNTER — ANESTHESIA EVENT (OUTPATIENT)
Age: 65
End: 2025-08-05
Payer: MEDICARE

## 2025-08-05 VITALS
HEART RATE: 58 BPM | SYSTOLIC BLOOD PRESSURE: 146 MMHG | OXYGEN SATURATION: 100 % | DIASTOLIC BLOOD PRESSURE: 55 MMHG | RESPIRATION RATE: 10 BRPM | TEMPERATURE: 97.3 F

## 2025-08-05 LAB — GLUCOSE SERPL-MCNC: 145 MG/DL (ref 65–140)

## 2025-08-05 PROCEDURE — V2632 POST CHMBR INTRAOCULAR LENS: HCPCS | Performed by: OPHTHALMOLOGY

## 2025-08-05 DEVICE — IMPLANTABLE DEVICE: Type: IMPLANTABLE DEVICE | Site: EYE | Status: FUNCTIONAL

## 2025-08-05 RX ORDER — FENTANYL CITRATE 50 UG/ML
INJECTION, SOLUTION INTRAMUSCULAR; INTRAVENOUS AS NEEDED
Status: DISCONTINUED | OUTPATIENT
Start: 2025-08-05 | End: 2025-08-05

## 2025-08-05 RX ORDER — POVIDONE-IODINE 5 %
SOLUTION, NON-ORAL OPHTHALMIC (EYE) AS NEEDED
Status: DISCONTINUED | OUTPATIENT
Start: 2025-08-05 | End: 2025-08-05 | Stop reason: HOSPADM

## 2025-08-05 RX ORDER — CYCLOPENTOLATE HYDROCHLORIDE 10 MG/ML
1 SOLUTION/ DROPS OPHTHALMIC
Status: COMPLETED | OUTPATIENT
Start: 2025-08-05 | End: 2025-08-05

## 2025-08-05 RX ORDER — MIDAZOLAM HYDROCHLORIDE 2 MG/2ML
INJECTION, SOLUTION INTRAMUSCULAR; INTRAVENOUS AS NEEDED
Status: DISCONTINUED | OUTPATIENT
Start: 2025-08-05 | End: 2025-08-05

## 2025-08-05 RX ORDER — TROPICAMIDE 10 MG/ML
1 SOLUTION/ DROPS OPHTHALMIC
Status: COMPLETED | OUTPATIENT
Start: 2025-08-05 | End: 2025-08-05

## 2025-08-05 RX ORDER — TETRACAINE HYDROCHLORIDE 5 MG/ML
1 SOLUTION OPHTHALMIC ONCE
Status: COMPLETED | OUTPATIENT
Start: 2025-08-05 | End: 2025-08-05

## 2025-08-05 RX ORDER — PHENYLEPHRINE HYDROCHLORIDE 25 MG/ML
1 SOLUTION/ DROPS OPHTHALMIC
Status: ACTIVE | OUTPATIENT
Start: 2025-08-05 | End: 2025-08-05

## 2025-08-05 RX ORDER — CYCLOPENTOLATE HYDROCHLORIDE 10 MG/ML
1 SOLUTION/ DROPS OPHTHALMIC
Status: ACTIVE | OUTPATIENT
Start: 2025-08-05 | End: 2025-08-05

## 2025-08-05 RX ORDER — PHENYLEPHRINE HYDROCHLORIDE 25 MG/ML
1 SOLUTION/ DROPS OPHTHALMIC
Status: COMPLETED | OUTPATIENT
Start: 2025-08-05 | End: 2025-08-05

## 2025-08-05 RX ORDER — TETRACAINE HYDROCHLORIDE 5 MG/ML
1 SOLUTION OPHTHALMIC ONCE
Status: DISCONTINUED | OUTPATIENT
Start: 2025-08-05 | End: 2025-08-07 | Stop reason: HOSPADM

## 2025-08-05 RX ORDER — TROPICAMIDE 10 MG/ML
1 SOLUTION/ DROPS OPHTHALMIC
Status: ACTIVE | OUTPATIENT
Start: 2025-08-05 | End: 2025-08-05

## 2025-08-05 RX ORDER — TETRACAINE HYDROCHLORIDE 5 MG/ML
SOLUTION OPHTHALMIC AS NEEDED
Status: DISCONTINUED | OUTPATIENT
Start: 2025-08-05 | End: 2025-08-05 | Stop reason: HOSPADM

## 2025-08-05 RX ORDER — PILOCARPINE HYDROCHLORIDE 10 MG/ML
SOLUTION/ DROPS OPHTHALMIC AS NEEDED
Status: DISCONTINUED | OUTPATIENT
Start: 2025-08-05 | End: 2025-08-05 | Stop reason: HOSPADM

## 2025-08-05 RX ORDER — MOXIFLOXACIN PF 5 MG/ML
INJECTION, SOLUTION OPHTHALMIC AS NEEDED
Status: DISCONTINUED | OUTPATIENT
Start: 2025-08-05 | End: 2025-08-05 | Stop reason: HOSPADM

## 2025-08-05 RX ORDER — LIDOCAINE HYDROCHLORIDE 10 MG/ML
INJECTION, SOLUTION EPIDURAL; INFILTRATION; INTRACAUDAL; PERINEURAL AS NEEDED
Status: DISCONTINUED | OUTPATIENT
Start: 2025-08-05 | End: 2025-08-05 | Stop reason: HOSPADM

## 2025-08-05 RX ADMIN — FENTANYL CITRATE 50 MCG: 50 INJECTION, SOLUTION INTRAMUSCULAR; INTRAVENOUS at 09:41

## 2025-08-05 RX ADMIN — CYCLOPENTOLATE HYDROCHLORIDE 1 DROP: 10 SOLUTION OPHTHALMIC at 08:28

## 2025-08-05 RX ADMIN — MIDAZOLAM HYDROCHLORIDE 2 MG: 1 INJECTION, SOLUTION INTRAMUSCULAR; INTRAVENOUS at 09:34

## 2025-08-05 RX ADMIN — PHENYLEPHRINE HYDROCHLORIDE 1 DROP: 25 SOLUTION/ DROPS OPHTHALMIC at 08:22

## 2025-08-05 RX ADMIN — CYCLOPENTOLATE HYDROCHLORIDE 1 DROP: 10 SOLUTION OPHTHALMIC at 08:40

## 2025-08-05 RX ADMIN — TROPICAMIDE 1 DROP: 10 SOLUTION/ DROPS OPHTHALMIC at 08:28

## 2025-08-05 RX ADMIN — CYCLOPENTOLATE HYDROCHLORIDE 1 DROP: 10 SOLUTION OPHTHALMIC at 08:22

## 2025-08-05 RX ADMIN — FENTANYL CITRATE 50 MCG: 50 INJECTION, SOLUTION INTRAMUSCULAR; INTRAVENOUS at 09:47

## 2025-08-05 RX ADMIN — PHENYLEPHRINE HYDROCHLORIDE 1 DROP: 25 SOLUTION/ DROPS OPHTHALMIC at 08:28

## 2025-08-05 RX ADMIN — TROPICAMIDE 1 DROP: 10 SOLUTION/ DROPS OPHTHALMIC at 08:40

## 2025-08-05 RX ADMIN — TROPICAMIDE 1 DROP: 10 SOLUTION/ DROPS OPHTHALMIC at 08:22

## 2025-08-05 RX ADMIN — TETRACAINE HYDROCHLORIDE 1 DROP: 5 SOLUTION OPHTHALMIC at 08:22

## 2025-08-05 RX ADMIN — PHENYLEPHRINE HYDROCHLORIDE 1 DROP: 25 SOLUTION/ DROPS OPHTHALMIC at 08:40

## (undated) DEVICE — OCUCOAT

## (undated) DEVICE — Device

## (undated) DEVICE — IRRIGATION HANDPIECE SET 2.2-2.8MM 45DEG ANGL TIP